# Patient Record
Sex: MALE | Race: WHITE | NOT HISPANIC OR LATINO | Employment: FULL TIME | URBAN - METROPOLITAN AREA
[De-identification: names, ages, dates, MRNs, and addresses within clinical notes are randomized per-mention and may not be internally consistent; named-entity substitution may affect disease eponyms.]

---

## 2017-02-02 DIAGNOSIS — G47.419 NARCOLEPSY WITHOUT CATAPLEXY(347.00): ICD-10-CM

## 2017-02-02 RX ORDER — METHYLPHENIDATE HYDROCHLORIDE 54 MG/1
54 TABLET ORAL EVERY MORNING
Qty: 30 TABLET | Refills: 0 | Status: SHIPPED | OUTPATIENT
Start: 2017-02-02 | End: 2017-04-03 | Stop reason: SDUPTHER

## 2017-02-02 RX ORDER — METHYLPHENIDATE HYDROCHLORIDE 5 MG/1
TABLET ORAL
Qty: 60 TABLET | Refills: 0 | Status: SHIPPED | OUTPATIENT
Start: 2017-02-02 | End: 2017-04-03 | Stop reason: SDUPTHER

## 2017-03-31 ENCOUNTER — PATIENT MESSAGE (OUTPATIENT)
Dept: SLEEP MEDICINE | Facility: CLINIC | Age: 27
End: 2017-03-31

## 2017-03-31 DIAGNOSIS — G47.419 NARCOLEPSY WITHOUT CATAPLEXY(347.00): ICD-10-CM

## 2017-03-31 RX ORDER — METHYLPHENIDATE HYDROCHLORIDE 54 MG/1
54 TABLET ORAL EVERY MORNING
Qty: 30 TABLET | Refills: 0 | Status: CANCELLED | OUTPATIENT
Start: 2017-03-31

## 2017-03-31 RX ORDER — METHYLPHENIDATE HYDROCHLORIDE 5 MG/1
TABLET ORAL
Qty: 60 TABLET | Refills: 0 | Status: CANCELLED | OUTPATIENT
Start: 2017-03-31

## 2017-04-03 ENCOUNTER — TELEPHONE (OUTPATIENT)
Dept: SLEEP MEDICINE | Facility: CLINIC | Age: 27
End: 2017-04-03

## 2017-04-03 ENCOUNTER — PATIENT MESSAGE (OUTPATIENT)
Dept: SLEEP MEDICINE | Facility: CLINIC | Age: 27
End: 2017-04-03

## 2017-04-03 DIAGNOSIS — G47.419 NARCOLEPSY WITHOUT CATAPLEXY(347.00): ICD-10-CM

## 2017-04-03 RX ORDER — METHYLPHENIDATE HYDROCHLORIDE 5 MG/1
TABLET ORAL
Qty: 60 TABLET | Refills: 0 | Status: SHIPPED | OUTPATIENT
Start: 2017-04-03 | End: 2017-11-02 | Stop reason: SDUPTHER

## 2017-04-03 RX ORDER — METHYLPHENIDATE HYDROCHLORIDE 54 MG/1
54 TABLET ORAL EVERY MORNING
Qty: 30 TABLET | Refills: 0 | Status: SHIPPED | OUTPATIENT
Start: 2017-04-03 | End: 2017-06-30 | Stop reason: SDUPTHER

## 2017-04-03 NOTE — TELEPHONE ENCOUNTER
Will reorder the medications to the pharmacy.              ---------------------               The following medication renewals have been denied:      - methylphenidate (RITALIN) 5 MG tablet    - methylphenidate (CONCERTA) 54 MG CR tablet     If you have any questions about your prescription, please send a message to your doctor.                                       Rosio Carlisle MA routed conversation to You 3 days ago                       Jose Carlisle MA 3 days ago                       Jose Plummer   You 3 days ago                          Original authorizing provider: MD Jose Jasso would like a refill of the following medications:   methylphenidate (RITALIN) 5 MG tablet [Deisi Villalta MD]   methylphenidate (CONCERTA) 54 MG CR tablet [Deisi Villalta MD]     Preferred pharmacy: [facility] [address] [place] [address] [address] AT [facility] [address]     Comment:

## 2017-06-30 ENCOUNTER — TELEPHONE (OUTPATIENT)
Dept: SLEEP MEDICINE | Facility: CLINIC | Age: 27
End: 2017-06-30

## 2017-06-30 DIAGNOSIS — G47.419 NARCOLEPSY WITHOUT CATAPLEXY(347.00): ICD-10-CM

## 2017-06-30 RX ORDER — METHYLPHENIDATE HYDROCHLORIDE 54 MG/1
54 TABLET ORAL EVERY MORNING
Qty: 30 TABLET | Refills: 0 | Status: SHIPPED | OUTPATIENT
Start: 2017-06-30 | End: 2017-11-02 | Stop reason: SDUPTHER

## 2017-11-02 ENCOUNTER — PATIENT MESSAGE (OUTPATIENT)
Dept: SLEEP MEDICINE | Facility: CLINIC | Age: 27
End: 2017-11-02

## 2017-11-02 DIAGNOSIS — G47.419 NARCOLEPSY WITHOUT CATAPLEXY(347.00): ICD-10-CM

## 2017-11-02 DIAGNOSIS — G47.419 NARCOLEPSY WITHOUT CATAPLEXY: ICD-10-CM

## 2017-11-02 RX ORDER — METHYLPHENIDATE HYDROCHLORIDE 5 MG/1
TABLET ORAL
Qty: 60 TABLET | Refills: 0 | Status: SHIPPED | OUTPATIENT
Start: 2017-11-02 | End: 2018-01-09 | Stop reason: SDUPTHER

## 2017-11-02 RX ORDER — METHYLPHENIDATE HYDROCHLORIDE 54 MG/1
54 TABLET ORAL EVERY MORNING
Qty: 30 TABLET | Refills: 0 | OUTPATIENT
Start: 2017-11-02

## 2017-11-02 RX ORDER — METHYLPHENIDATE HYDROCHLORIDE 54 MG/1
54 TABLET ORAL EVERY MORNING
Qty: 30 TABLET | Refills: 0 | Status: SHIPPED | OUTPATIENT
Start: 2017-11-02 | End: 2018-01-09 | Stop reason: SDUPTHER

## 2017-11-02 NOTE — TELEPHONE ENCOUNTER
I will refill medications - please put on a recall to be able to see him late January when his insurance kicks in.            I have asked for a renewal on my medications. We recently came into a financial issue. I lost my job and insurance along with it. I now have a new job and I am almost out of medicine. The company I am with is not allowing me to get insured until January now. I can afford the medications I need but won't be able to see you until I receive insurance.

## 2018-01-09 DIAGNOSIS — G47.419 NARCOLEPSY WITHOUT CATAPLEXY: ICD-10-CM

## 2018-01-11 RX ORDER — METHYLPHENIDATE HYDROCHLORIDE 54 MG/1
54 TABLET ORAL EVERY MORNING
Qty: 30 TABLET | Refills: 0 | Status: SHIPPED | OUTPATIENT
Start: 2018-01-11 | End: 2018-02-22 | Stop reason: ALTCHOICE

## 2018-01-11 RX ORDER — METHYLPHENIDATE HYDROCHLORIDE 5 MG/1
TABLET ORAL
Qty: 60 TABLET | Refills: 0 | Status: SHIPPED | OUTPATIENT
Start: 2018-01-11 | End: 2018-02-22 | Stop reason: SDUPTHER

## 2018-02-22 ENCOUNTER — OFFICE VISIT (OUTPATIENT)
Dept: SLEEP MEDICINE | Facility: CLINIC | Age: 28
End: 2018-02-22
Payer: COMMERCIAL

## 2018-02-22 VITALS
HEIGHT: 66 IN | DIASTOLIC BLOOD PRESSURE: 80 MMHG | SYSTOLIC BLOOD PRESSURE: 119 MMHG | WEIGHT: 172 LBS | BODY MASS INDEX: 27.64 KG/M2 | HEART RATE: 84 BPM

## 2018-02-22 DIAGNOSIS — G47.419 NARCOLEPSY WITHOUT CATAPLEXY: ICD-10-CM

## 2018-02-22 PROCEDURE — 99999 PR PBB SHADOW E&M-EST. PATIENT-LVL III: CPT | Mod: PBBFAC,,, | Performed by: PSYCHIATRY & NEUROLOGY

## 2018-02-22 PROCEDURE — 99204 OFFICE O/P NEW MOD 45 MIN: CPT | Mod: S$GLB,,, | Performed by: PSYCHIATRY & NEUROLOGY

## 2018-02-22 RX ORDER — METHYLPHENIDATE HYDROCHLORIDE 5 MG/1
TABLET ORAL
Qty: 90 TABLET | Refills: 0 | Status: SHIPPED | OUTPATIENT
Start: 2018-02-22 | End: 2018-04-11 | Stop reason: SDUPTHER

## 2018-02-22 RX ORDER — METHYLPHENIDATE HYDROCHLORIDE 36 MG/1
36 TABLET ORAL EVERY MORNING
Qty: 30 TABLET | Refills: 0 | Status: SHIPPED | OUTPATIENT
Start: 2018-02-22 | End: 2018-04-03 | Stop reason: SDUPTHER

## 2018-02-22 NOTE — PROGRESS NOTES
"       INITIAL HISTORY OF PRESENT ILLNESS:  Jose Plummer is a 28 y.o. male is here for mgt. Of narcolepsy.     CHIEF COMPLAINT:    Sleep hx:  excessive daytime sleepiness, excessive daytime fatigue and snoring since  highschool.    He is a mouth breather.    His mother was recently diagnosed with ROMERO    Denies  dry mouth and sore throat  Reports occasional nasal congestion when sleeping  Reports occasional  morning headaches  Denies  interrupted sleep  Denies frequent leg movements  Denies symptoms concerning for parasomnia    The ESS ([place] Sleepiness Score) taken on initial visit is 15 /24.  He reports significant sleepiness while driving - for long distances has been needing a ride lately.  He has significant retrognathia - considering maxilla advancement surgeon.      The patient never had tonsillectomy, adenoidectomy or UPPP       INTERVAL HISTORY:    09/30/2014: The patient has not presented any new complaints since the previous visit. He reports Some improvement in sleep continuity and snoring. on Breath Rite strips. His AHI on recent PSG was 0.1 and RDI was 2.5/hour. He states that he is averaging 7-8 hours per night. He is going to switch 3-11 PM shift. he denies cataplexy, sleep paralysis, hallucinations. Still reports significant daytimes sleepiness affecting his work ([employer]) and everyday life. ESS 13/24.      01/12/2015:   He comes to discuss PSG/MSLT results. he denies cataplexy, sleep paralysis, hallucinations, headaches, palpitations, tremors, anxiety, or rash. Never took stimulants in the past.      02/09/2015: Mr. Plummer reports improved sleepiness on Nuvigil 150 mg - to stay focused cuts pills to get 200 mg dose which works better. Tolerates well. /75 today.  He is taking caffeine to stay awake again. No headache. Nuvigil helps to concentrate. ESS 5/24 today. His girlfriend feels his personality changed to "less happy" since on Nuvigil. Denied headache and " nausea.    05/26/2015  He comes with his sister Denise.  Tolerates 200 mg Nuvigil well. Occasional headaches that resolve spontaneously. No rash. /73 still. No night shifts. No difficulty falling asleep. No rash.  Nuvigil seems to loose effect - state it is hard for him to stay awake at work (about 3:30 ). ESS 9/24.     10/12/15:  Insufficient effect on 250 mg Nuvigil was not effective.  Ritalin 10 mg IR was started by JOSÉ MIGUEL Eisenberg- currently taking 10-20 mg per day. Nuvigil was d/c. On Ritalin still feels groggy before and after work, and that affects driving.  Work performance has improved on Ritalin. ESS 16/24 today.     02/17/2016: Concerta was recently increased from 27 to 36, as 27 started being insufficient. Taking 1 or 2 Ritalin IR 5 mg in addition to that.  Takes a nap at work at times. ESS11/24. Denied cataplexy - denied more generalized continuous weakness/fatigue throughout the day. Occasionally has the pain at the back of the neck and over trapezious area that started on Ritalin 27 mg - worse on 36 mg. Never tried to miss a day to see if that helps neck pain. ESS 11/24. Sleeps 7 hrs at night and sometimes 15-30 min nap at work. Naps are refreshing 30 min after a nap (initially groggy). Does not remember when he had a dream last time.  No headaches, mood swings, anorexia. Denied catoplexia.    6/9/16: On Concerta 36mg qam not lasting as long anymore, has to take earlier booster ritalin, now at 0730-8a, which only last ~ 1hr also. He has been having to take add'l 5mg to help for the past 2-mos.  Work performance being affected.Having to leave work at 2p instead of earlier due to slower performance, fatigue. Medications work well the first month or so then lose effectiveness. Tried nuvigil. Provigil, ritalin, concerta. Having episodic headaches, mod-severe, unable to describe nature/location, occurring when laughing or cutting up with friends. Premedicating with 3 advil sometimes helps. Advil typically  "relieves headache +rest. If not tylenol or aleve may work. Occasional slurring of speech when very sleepy, his wife notices. She is here today. ESS 17 today. HIT-6 score= 63. Worried about what will happen when nothing works anymore.     07/07/2016: Concerta 60 mg  works 2 AM - till 9 AM. Sometimes taking Ritalin at 9 AM  And noontime later. ESS is 10/'24 - happy with sleepiness control. His wife noticed that at times he starts mumbling when sleepiness comes on. He is not aware of that. That's not related to emotion. Still having headaches when he is with friends. Has no clear correlation with emotions. VS were stable today. Bedtime 7:30-8 pm - and he is up with at 2 AM. When he was on 8 hrs sleep schedule - he did not notice difference in sleepiness.   He reports moodiness - wonders he should see a psychiatrist. He has not tried Xyrem yet.     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  Sleepiness significantly improved with better work scheduled.  ESS 13/24.   Concerta 54 - feels "too high"   now - feeling jittery in the mid-day and headaches on a higher dpse and sometimes trouble falling asleep.  He liked his brother's Concerta 36 mg much better (brother taking for ADD).  Taking Ritalin 5 IR as needed on some days. Some days no taking Concerta, but taking 3 pills of 5 mg Ritalin ER.   Still occasional muscle  Spasms that he used to relate to Concerta. No further indications of cataplexy.  No trouble falling or staying asleep.    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    Bed partner: Mylene  Time to bed: 10-11 PM  Sleep onset latency: 30 min  Disruptions or awakenings: 0  Time to fall back into sleep: not long  Wakeup time: 5 AM for morning shift; or till 10:30-11:11 AM   Perceived sleep quality: 3/5  Perceived total sleep time:  7-8  hours.  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 MSLT 15 seconds. REM sleep on 3/4 naps.                            REVIEW OF SYSTEMS:   Sleep related symptoms as per HPI, lack of appetite after 2pm (not eating dinners with family anymore), denies depression but not sure or not, aggravated by feeling sleepy, Otherwise a balance review of 10-systems is negative.       PHYSICAL EXAM:  /80   Pulse 84   Ht 5' 6" (1.676 m)   Wt 78 kg (172 lb)   BMI 27.76 kg/m²   GENERAL: Normal development, well groomed.      ASSESSMENT:    1.Narcolepsy with mild cataplexy recently (slurred speech). PSG was negative for  sleep disordered breathing. He still reports significant sleepiness affecting his job and daily life. he denies sleep paralysis, hallucinations. He appears to sleep sufficient hours. Extensive metabolic work up for fatigue/hypersomnolence was normal. Sleep disordered breathing was ruled out. Failed Nuvigil.Concerta was escalated to 60 mg + Ritalin 10 mg BID - currently working well. Has not tried Xyrem yet.       PLAN:    Concerta 60 mg and Ritalin 10 mg BID are well tolerated.    He has not tried Xyrem yet.    Adderall is another option    If he decides to take Xyrem, Begin Xyrem 4.5 G taken 2 divided doses and up-titrate to goal of 6G prior to next clinic visit, re-evaluation. Discussed guideline to treat cataplexy and EDS, safe use, serious side effects associated with use. He does not drink alcohol. REM program enrollment form and 30d trial form/script completed today.     Precautions: The patient was advised to abstain from driving should he feel sleepy or drowsy.    RTC 6-wks. Case discussed with Dr. Villalta            "

## 2018-04-03 RX ORDER — METHYLPHENIDATE HYDROCHLORIDE 36 MG/1
36 TABLET ORAL EVERY MORNING
Qty: 30 TABLET | Refills: 0 | Status: SHIPPED | OUTPATIENT
Start: 2018-04-03 | End: 2018-04-11 | Stop reason: SDUPTHER

## 2018-04-11 ENCOUNTER — PATIENT MESSAGE (OUTPATIENT)
Dept: SLEEP MEDICINE | Facility: CLINIC | Age: 28
End: 2018-04-11

## 2018-04-11 DIAGNOSIS — G47.419 NARCOLEPSY WITHOUT CATAPLEXY: ICD-10-CM

## 2018-04-11 RX ORDER — METHYLPHENIDATE HYDROCHLORIDE 5 MG/1
TABLET ORAL
Qty: 90 TABLET | Refills: 0 | Status: SHIPPED | OUTPATIENT
Start: 2018-04-11 | End: 2018-04-16 | Stop reason: SDUPTHER

## 2018-04-11 RX ORDER — METHYLPHENIDATE HYDROCHLORIDE 36 MG/1
36 TABLET ORAL EVERY MORNING
Qty: 30 TABLET | Refills: 0 | Status: SHIPPED | OUTPATIENT
Start: 2018-04-11 | End: 2018-05-14 | Stop reason: SDUPTHER

## 2018-04-16 ENCOUNTER — TELEPHONE (OUTPATIENT)
Dept: SLEEP MEDICINE | Facility: CLINIC | Age: 28
End: 2018-04-16

## 2018-04-16 DIAGNOSIS — G47.419 NARCOLEPSY WITHOUT CATAPLEXY: ICD-10-CM

## 2018-04-16 RX ORDER — METHYLPHENIDATE HYDROCHLORIDE 5 MG/1
TABLET ORAL
Qty: 90 TABLET | Refills: 0 | Status: SHIPPED | OUTPATIENT
Start: 2018-04-16 | End: 2018-05-14 | Stop reason: SDUPTHER

## 2018-04-16 NOTE — TELEPHONE ENCOUNTER
I wanted to make sure my pharmacy was changed to the [facility] in [facility] off of [facility]. I [facility] and I wanted the make sure it was switched.      I will resend rx to [facility] in [place]

## 2018-05-14 ENCOUNTER — PATIENT MESSAGE (OUTPATIENT)
Dept: SLEEP MEDICINE | Facility: CLINIC | Age: 28
End: 2018-05-14

## 2018-05-14 DIAGNOSIS — G47.419 NARCOLEPSY WITHOUT CATAPLEXY: ICD-10-CM

## 2018-05-14 RX ORDER — METHYLPHENIDATE HYDROCHLORIDE 5 MG/1
TABLET ORAL
Qty: 90 TABLET | Refills: 0 | Status: SHIPPED | OUTPATIENT
Start: 2018-05-14 | End: 2018-06-26 | Stop reason: SDUPTHER

## 2018-05-14 RX ORDER — METHYLPHENIDATE HYDROCHLORIDE 36 MG/1
36 TABLET ORAL EVERY MORNING
Qty: 30 TABLET | Refills: 0 | Status: SHIPPED | OUTPATIENT
Start: 2018-05-14 | End: 2018-06-13 | Stop reason: SDUPTHER

## 2018-05-14 NOTE — TELEPHONE ENCOUNTER
Patient would like to have a refill on the following medication:    Refill on concerta and ritalin please

## 2018-06-13 ENCOUNTER — TELEPHONE (OUTPATIENT)
Dept: SLEEP MEDICINE | Facility: CLINIC | Age: 28
End: 2018-06-13

## 2018-06-13 ENCOUNTER — PATIENT MESSAGE (OUTPATIENT)
Dept: SLEEP MEDICINE | Facility: CLINIC | Age: 28
End: 2018-06-13

## 2018-06-13 DIAGNOSIS — G47.419 NARCOLEPSY WITHOUT CATAPLEXY: Primary | ICD-10-CM

## 2018-06-13 RX ORDER — METHYLPHENIDATE HYDROCHLORIDE 36 MG/1
36 TABLET ORAL EVERY MORNING
Qty: 30 TABLET | Refills: 0 | Status: SHIPPED | OUTPATIENT
Start: 2018-06-13 | End: 2018-07-16 | Stop reason: SDUPTHER

## 2018-06-25 ENCOUNTER — PATIENT MESSAGE (OUTPATIENT)
Dept: SLEEP MEDICINE | Facility: CLINIC | Age: 28
End: 2018-06-25

## 2018-06-26 DIAGNOSIS — G47.419 NARCOLEPSY WITHOUT CATAPLEXY: ICD-10-CM

## 2018-06-26 RX ORDER — METHYLPHENIDATE HYDROCHLORIDE 5 MG/1
TABLET ORAL
Qty: 90 TABLET | Refills: 0 | Status: SHIPPED | OUTPATIENT
Start: 2018-06-26 | End: 2018-07-26 | Stop reason: SDUPTHER

## 2018-07-14 ENCOUNTER — PATIENT MESSAGE (OUTPATIENT)
Dept: SLEEP MEDICINE | Facility: CLINIC | Age: 28
End: 2018-07-14

## 2018-07-16 DIAGNOSIS — G47.419 NARCOLEPSY WITHOUT CATAPLEXY: ICD-10-CM

## 2018-07-16 RX ORDER — METHYLPHENIDATE HYDROCHLORIDE 36 MG/1
36 TABLET ORAL EVERY MORNING
Qty: 30 TABLET | Refills: 0 | Status: SHIPPED | OUTPATIENT
Start: 2018-07-16 | End: 2018-08-16 | Stop reason: SDUPTHER

## 2018-07-26 ENCOUNTER — PATIENT MESSAGE (OUTPATIENT)
Dept: SLEEP MEDICINE | Facility: CLINIC | Age: 28
End: 2018-07-26

## 2018-07-26 DIAGNOSIS — G47.419 NARCOLEPSY WITHOUT CATAPLEXY: ICD-10-CM

## 2018-07-26 RX ORDER — METHYLPHENIDATE HYDROCHLORIDE 5 MG/1
TABLET ORAL
Qty: 90 TABLET | Refills: 0 | Status: SHIPPED | OUTPATIENT
Start: 2018-07-26 | End: 2018-08-28 | Stop reason: SDUPTHER

## 2018-08-16 ENCOUNTER — PATIENT MESSAGE (OUTPATIENT)
Dept: SLEEP MEDICINE | Facility: CLINIC | Age: 28
End: 2018-08-16

## 2018-08-16 DIAGNOSIS — G47.419 NARCOLEPSY WITHOUT CATAPLEXY: ICD-10-CM

## 2018-08-17 RX ORDER — METHYLPHENIDATE HYDROCHLORIDE 36 MG/1
36 TABLET ORAL EVERY MORNING
Qty: 30 TABLET | Refills: 0 | Status: SHIPPED | OUTPATIENT
Start: 2018-08-17 | End: 2018-09-13 | Stop reason: SDUPTHER

## 2018-08-28 ENCOUNTER — PATIENT MESSAGE (OUTPATIENT)
Dept: SLEEP MEDICINE | Facility: CLINIC | Age: 28
End: 2018-08-28

## 2018-08-28 DIAGNOSIS — G47.419 NARCOLEPSY WITHOUT CATAPLEXY: ICD-10-CM

## 2018-08-28 RX ORDER — METHYLPHENIDATE HYDROCHLORIDE 5 MG/1
TABLET ORAL
Qty: 90 TABLET | Refills: 0 | Status: SHIPPED | OUTPATIENT
Start: 2018-08-28 | End: 2018-09-27 | Stop reason: SDUPTHER

## 2018-09-13 ENCOUNTER — PATIENT MESSAGE (OUTPATIENT)
Dept: SLEEP MEDICINE | Facility: CLINIC | Age: 28
End: 2018-09-13

## 2018-09-13 DIAGNOSIS — G47.419 NARCOLEPSY WITHOUT CATAPLEXY: ICD-10-CM

## 2018-09-13 RX ORDER — METHYLPHENIDATE HYDROCHLORIDE 36 MG/1
36 TABLET ORAL EVERY MORNING
Qty: 30 TABLET | Refills: 0 | Status: SHIPPED | OUTPATIENT
Start: 2018-09-13 | End: 2018-10-15 | Stop reason: SDUPTHER

## 2018-09-26 ENCOUNTER — PATIENT MESSAGE (OUTPATIENT)
Dept: SLEEP MEDICINE | Facility: CLINIC | Age: 28
End: 2018-09-26

## 2018-09-27 DIAGNOSIS — G47.419 NARCOLEPSY WITHOUT CATAPLEXY: ICD-10-CM

## 2018-09-27 RX ORDER — METHYLPHENIDATE HYDROCHLORIDE 5 MG/1
TABLET ORAL
Qty: 90 TABLET | Refills: 0 | Status: SHIPPED | OUTPATIENT
Start: 2018-09-27 | End: 2018-10-15 | Stop reason: SDUPTHER

## 2018-10-14 ENCOUNTER — PATIENT MESSAGE (OUTPATIENT)
Dept: SLEEP MEDICINE | Facility: CLINIC | Age: 28
End: 2018-10-14

## 2018-10-15 DIAGNOSIS — G47.419 NARCOLEPSY WITHOUT CATAPLEXY: ICD-10-CM

## 2018-10-15 RX ORDER — METHYLPHENIDATE HYDROCHLORIDE 36 MG/1
36 TABLET ORAL EVERY MORNING
Qty: 30 TABLET | Refills: 0 | Status: SHIPPED | OUTPATIENT
Start: 2018-10-15 | End: 2018-11-16 | Stop reason: SDUPTHER

## 2018-10-15 RX ORDER — METHYLPHENIDATE HYDROCHLORIDE 5 MG/1
TABLET ORAL
Qty: 90 TABLET | Refills: 0 | Status: SHIPPED | OUTPATIENT
Start: 2018-10-15 | End: 2018-11-28 | Stop reason: SDUPTHER

## 2018-11-16 ENCOUNTER — PATIENT MESSAGE (OUTPATIENT)
Dept: SLEEP MEDICINE | Facility: CLINIC | Age: 28
End: 2018-11-16

## 2018-11-16 DIAGNOSIS — G47.419 NARCOLEPSY WITHOUT CATAPLEXY: ICD-10-CM

## 2018-11-16 RX ORDER — METHYLPHENIDATE HYDROCHLORIDE 36 MG/1
36 TABLET ORAL EVERY MORNING
Qty: 30 TABLET | Refills: 0 | Status: SHIPPED | OUTPATIENT
Start: 2018-11-16 | End: 2018-12-14 | Stop reason: SDUPTHER

## 2018-11-28 ENCOUNTER — PATIENT MESSAGE (OUTPATIENT)
Dept: SLEEP MEDICINE | Facility: CLINIC | Age: 28
End: 2018-11-28

## 2018-11-28 DIAGNOSIS — G47.419 NARCOLEPSY WITHOUT CATAPLEXY: ICD-10-CM

## 2018-11-28 RX ORDER — METHYLPHENIDATE HYDROCHLORIDE 5 MG/1
TABLET ORAL
Qty: 90 TABLET | Refills: 0 | Status: SHIPPED | OUTPATIENT
Start: 2018-11-28 | End: 2018-12-14 | Stop reason: SDUPTHER

## 2018-12-14 ENCOUNTER — PATIENT MESSAGE (OUTPATIENT)
Dept: SLEEP MEDICINE | Facility: CLINIC | Age: 28
End: 2018-12-14

## 2018-12-14 DIAGNOSIS — G47.419 NARCOLEPSY WITHOUT CATAPLEXY: ICD-10-CM

## 2018-12-14 RX ORDER — METHYLPHENIDATE HYDROCHLORIDE 5 MG/1
TABLET ORAL
Qty: 90 TABLET | Refills: 0 | Status: SHIPPED | OUTPATIENT
Start: 2018-12-14 | End: 2019-02-18 | Stop reason: SDUPTHER

## 2018-12-14 RX ORDER — METHYLPHENIDATE HYDROCHLORIDE 36 MG/1
36 TABLET ORAL EVERY MORNING
Qty: 30 TABLET | Refills: 0 | Status: SHIPPED | OUTPATIENT
Start: 2018-12-14 | End: 2019-01-23 | Stop reason: SDUPTHER

## 2019-01-18 ENCOUNTER — PATIENT MESSAGE (OUTPATIENT)
Dept: SLEEP MEDICINE | Facility: CLINIC | Age: 29
End: 2019-01-18

## 2019-01-18 ENCOUNTER — TELEPHONE (OUTPATIENT)
Dept: SLEEP MEDICINE | Facility: CLINIC | Age: 29
End: 2019-01-18

## 2019-01-19 NOTE — TELEPHONE ENCOUNTER
Prescription     Maikol Muro MA   You 12 hours ago (6:42 AM)      Pt asked to change pharmacy from [facility] to [facility] in [facility]. He's also requesting a refill on rx Concerta.    Routing Comment       Jose Plummer   You 18 hours ago (12:07 AM)         I would like to change where my prescription goes. It's the [facility] in [facility]. My medicine is cheaper there then at [facility]. I'm low on the concerta.

## 2019-01-23 DIAGNOSIS — G47.419 NARCOLEPSY WITHOUT CATAPLEXY: ICD-10-CM

## 2019-01-23 RX ORDER — METHYLPHENIDATE HYDROCHLORIDE 36 MG/1
36 TABLET ORAL EVERY MORNING
Qty: 30 TABLET | Refills: 0 | Status: SHIPPED | OUTPATIENT
Start: 2019-01-23 | End: 2020-03-16 | Stop reason: ALTCHOICE

## 2019-02-15 ENCOUNTER — PATIENT MESSAGE (OUTPATIENT)
Dept: SLEEP MEDICINE | Facility: CLINIC | Age: 29
End: 2019-02-15

## 2019-02-15 DIAGNOSIS — G47.419 NARCOLEPSY WITHOUT CATAPLEXY: ICD-10-CM

## 2019-02-18 ENCOUNTER — PATIENT MESSAGE (OUTPATIENT)
Dept: SLEEP MEDICINE | Facility: CLINIC | Age: 29
End: 2019-02-18

## 2019-02-18 RX ORDER — METHYLPHENIDATE HYDROCHLORIDE 5 MG/1
TABLET ORAL
Qty: 90 TABLET | Refills: 0 | Status: SHIPPED | OUTPATIENT
Start: 2019-02-18 | End: 2019-04-22 | Stop reason: SDUPTHER

## 2019-04-20 ENCOUNTER — PATIENT MESSAGE (OUTPATIENT)
Dept: SLEEP MEDICINE | Facility: CLINIC | Age: 29
End: 2019-04-20

## 2019-04-22 DIAGNOSIS — G47.419 NARCOLEPSY WITHOUT CATAPLEXY: ICD-10-CM

## 2019-04-22 RX ORDER — METHYLPHENIDATE HYDROCHLORIDE 5 MG/1
TABLET ORAL
Qty: 90 TABLET | Refills: 0 | Status: SHIPPED | OUTPATIENT
Start: 2019-04-22 | End: 2019-06-03 | Stop reason: SDUPTHER

## 2019-05-31 ENCOUNTER — PATIENT MESSAGE (OUTPATIENT)
Dept: SLEEP MEDICINE | Facility: CLINIC | Age: 29
End: 2019-05-31

## 2019-06-03 DIAGNOSIS — G47.419 NARCOLEPSY WITHOUT CATAPLEXY: ICD-10-CM

## 2019-06-03 RX ORDER — METHYLPHENIDATE HYDROCHLORIDE 5 MG/1
TABLET ORAL
Qty: 90 TABLET | Refills: 0 | Status: SHIPPED | OUTPATIENT
Start: 2019-06-03 | End: 2019-07-23 | Stop reason: SDUPTHER

## 2019-06-03 NOTE — TELEPHONE ENCOUNTER
Scott,    Please schedule for an annual follow up me or NP to discuss further therapy      Thanks!

## 2019-07-18 ENCOUNTER — PATIENT MESSAGE (OUTPATIENT)
Dept: SLEEP MEDICINE | Facility: CLINIC | Age: 29
End: 2019-07-18

## 2019-07-19 NOTE — TELEPHONE ENCOUNTER
Pt haven't been seen since 02/22/2018, I left pt a vm to call me back so I can schedule him, so he can get his refill.

## 2019-07-22 DIAGNOSIS — G47.419 NARCOLEPSY WITHOUT CATAPLEXY: ICD-10-CM

## 2019-07-22 NOTE — TELEPHONE ENCOUNTER
"Alma [facility] Staff   Caller: Mylene "wife"  [phone] (Today, 10:44 AM)             .Type: Patient Call Back     Who called: Mylene "wife"     What is the request in detail: Pt's wife states that he needs a Need a refill on ritalin bc he's having trouble waking up  For work and can't take care of their 7 month old baby.     Can the clinic reply by [facility]? call back     Would the patient rather a call back or a response via [facility]?  Call back     Best call back number: [phone]        "

## 2019-07-23 RX ORDER — METHYLPHENIDATE HYDROCHLORIDE 5 MG/1
TABLET ORAL
Qty: 90 TABLET | Refills: 0 | Status: SHIPPED | OUTPATIENT
Start: 2019-07-23 | End: 2020-03-16 | Stop reason: ALTCHOICE

## 2019-07-23 NOTE — TELEPHONE ENCOUNTER
----- Message from Haylie Rodgers MA sent at 7/19/2019  5:08 PM CDT -----  Contact: self      ----- Message -----  From: Hilda Rodríguez  Sent: 7/19/2019   2:10 PM  To: Pawan Lipscomb Staff    Type:  Patient Returning Call    Who Called: ERIBERTO SAHU [[id number]]    Who Left Message for Patient: Ivettgunjan    Does the patient know what this is regarding?: yes     Best Call Back Number:[phone]    Additional Information:

## 2019-07-29 ENCOUNTER — OFFICE VISIT (OUTPATIENT)
Dept: SLEEP MEDICINE | Facility: CLINIC | Age: 29
End: 2019-07-29
Payer: COMMERCIAL

## 2019-07-29 VITALS
BODY MASS INDEX: 29.89 KG/M2 | DIASTOLIC BLOOD PRESSURE: 73 MMHG | WEIGHT: 186 LBS | HEIGHT: 66 IN | SYSTOLIC BLOOD PRESSURE: 121 MMHG | HEART RATE: 83 BPM

## 2019-07-29 DIAGNOSIS — G47.419 NARCOLEPSY WITHOUT CATAPLEXY: Primary | ICD-10-CM

## 2019-07-29 PROCEDURE — 99212 OFFICE O/P EST SF 10 MIN: CPT | Mod: S$GLB,,, | Performed by: PSYCHIATRY & NEUROLOGY

## 2019-07-29 PROCEDURE — 3008F PR BODY MASS INDEX (BMI) DOCUMENTED: ICD-10-PCS | Mod: CPTII,S$GLB,, | Performed by: PSYCHIATRY & NEUROLOGY

## 2019-07-29 PROCEDURE — 99999 PR PBB SHADOW E&M-EST. PATIENT-LVL III: CPT | Mod: PBBFAC,,, | Performed by: PSYCHIATRY & NEUROLOGY

## 2019-07-29 PROCEDURE — 3008F BODY MASS INDEX DOCD: CPT | Mod: CPTII,S$GLB,, | Performed by: PSYCHIATRY & NEUROLOGY

## 2019-07-29 PROCEDURE — 99999 PR PBB SHADOW E&M-EST. PATIENT-LVL III: ICD-10-PCS | Mod: PBBFAC,,, | Performed by: PSYCHIATRY & NEUROLOGY

## 2019-07-29 PROCEDURE — 99212 PR OFFICE/OUTPT VISIT, EST, LEVL II, 10-19 MIN: ICD-10-PCS | Mod: S$GLB,,, | Performed by: PSYCHIATRY & NEUROLOGY

## 2019-07-29 RX ORDER — METHYLPHENIDATE HYDROCHLORIDE 10 MG/1
TABLET ORAL
Qty: 90 TABLET | Refills: 0 | Status: SHIPPED | OUTPATIENT
Start: 2019-07-29 | End: 2019-10-21 | Stop reason: SDUPTHER

## 2019-07-29 NOTE — PROGRESS NOTES
"       INITIAL HISTORY OF PRESENT ILLNESS:  Jose Plummer is a 29 y.o. male is here for mgt. Of narcolepsy.     CHIEF COMPLAINT:    Sleep hx:  excessive daytime sleepiness, excessive daytime fatigue and snoring since  highschool.    He is a mouth breather.    His mother was recently diagnosed with ROMERO    Denies  dry mouth and sore throat  Reports occasional nasal congestion when sleeping  Reports occasional  morning headaches  Denies  interrupted sleep  Denies frequent leg movements  Denies symptoms concerning for parasomnia    The ESS ([place] Sleepiness Score) taken on initial visit is 15 /24.  He reports significant sleepiness while driving - for long distances has been needing a ride lately.  He has significant retrognathia - considering maxilla advancement surgeon.      The patient never had tonsillectomy, adenoidectomy or UPPP       INTERVAL HISTORY:    09/30/2014: The patient has not presented any new complaints since the previous visit. He reports Some improvement in sleep continuity and snoring. on Breath Rite strips. His AHI on recent PSG was 0.1 and RDI was 2.5/hour. He states that he is averaging 7-8 hours per night. He is going to switch 3-11 PM shift. he denies cataplexy, sleep paralysis, hallucinations. Still reports significant daytimes sleepiness affecting his work ([employer]) and everyday life. ESS 13/24.      01/12/2015:   He comes to discuss PSG/MSLT results. he denies cataplexy, sleep paralysis, hallucinations, headaches, palpitations, tremors, anxiety, or rash. Never took stimulants in the past.      02/09/2015: Mr. Plummer reports improved sleepiness on Nuvigil 150 mg - to stay focused cuts pills to get 200 mg dose which works better. Tolerates well. /75 today.  He is taking caffeine to stay awake again. No headache. Nuvigil helps to concentrate. ESS 5/24 today. His girlfriend feels his personality changed to "less happy" since on Nuvigil. Denied headache and " nausea.    05/26/2015  He comes with his sister Denise.  Tolerates 200 mg Nuvigil well. Occasional headaches that resolve spontaneously. No rash. /73 still. No night shifts. No difficulty falling asleep. No rash.  Nuvigil seems to loose effect - state it is hard for him to stay awake at work (about 3:30 ). ESS 9/24.     10/12/15:  Insufficient effect on 250 mg Nuvigil was not effective.  Ritalin 10 mg IR was started by JOSÉ MIGUEL Eisenberg- currently taking 10-20 mg per day. Nuvigil was d/c. On Ritalin still feels groggy before and after work, and that affects driving.  Work performance has improved on Ritalin. ESS 16/24 today.     02/17/2016: Concerta was recently increased from 27 to 36, as 27 started being insufficient. Taking 1 or 2 Ritalin IR 5 mg in addition to that.  Takes a nap at work at times. ESS11/24. Denied cataplexy - denied more generalized continuous weakness/fatigue throughout the day. Occasionally has the pain at the back of the neck and over trapezious area that started on Ritalin 27 mg - worse on 36 mg. Never tried to miss a day to see if that helps neck pain. ESS 11/24. Sleeps 7 hrs at night and sometimes 15-30 min nap at work. Naps are refreshing 30 min after a nap (initially groggy). Does not remember when he had a dream last time.  No headaches, mood swings, anorexia. Denied catoplexia.    6/9/16: On Concerta 36mg qam not lasting as long anymore, has to take earlier booster ritalin, now at 0730-8a, which only last ~ 1hr also. He has been having to take add'l 5mg to help for the past 2-mos.  Work performance being affected.Having to leave work at 2p instead of earlier due to slower performance, fatigue. Medications work well the first month or so then lose effectiveness. Tried nuvigil. Provigil, ritalin, concerta. Having episodic headaches, mod-severe, unable to describe nature/location, occurring when laughing or cutting up with friends. Premedicating with 3 advil sometimes helps. Advil typically  "relieves headache +rest. If not tylenol or aleve may work. Occasional slurring of speech when very sleepy, his wife notices. She is here today. ESS 17 today. HIT-6 score= 63. Worried about what will happen when nothing works anymore.     07/07/2016: Concerta 60 mg  works 2 AM - till 9 AM. Sometimes taking Ritalin at 9 AM  And noontime later. ESS is 10/'24 - happy with sleepiness control. His wife noticed that at times he starts mumbling when sleepiness comes on. He is not aware of that. That's not related to emotion. Still having headaches when he is with friends. Has no clear correlation with emotions. VS were stable today. Bedtime 7:30-8 pm - and he is up with at 2 AM. When he was on 8 hrs sleep schedule - he did not notice difference in sleepiness.   He reports moodiness - wonders he should see a psychiatrist. He has not tried Xyrem yet.     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  Sleepiness significantly improved with better work scheduled.  ESS 13/24.   Concerta 54 - feels "too high"   now - feeling jittery in the mid-day and headaches on a higher dpse and sometimes trouble falling asleep.  He liked his brother's Concerta 36 mg much better (brother taking for ADD).  Taking Ritalin 5 IR as needed on some days. Some days no taking Concerta, but taking 3 pills of 5 mg Ritalin ER.   Still occasional muscle  Spasms that he used to relate to Concerta. No further indications of cataplexy.  No trouble falling or staying asleep.      07/29/2019: He is now working days - 7-3 PM.  Ran out of Concerta - and it now costs him $200 per month  Now taking 2-3 Ritalin 5 mg  pills a day.    Still residual sleepiness.    Has a 7 months old son now.  His wife now works nights.    + reports occasional speech slurring at the "low" of the dose, but hard to associate with emotion.    EPWORTH SLEEPINESS SCALE 7/29/2019   Sitting and reading 2   Watching TV " "1   Sitting, inactive in a public place (e.g. 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    Bed partner: Mylene  Time to bed: 10-11 PM  Sleep onset latency: 30 min  Disruptions or awakenings: 0  Time to fall back into sleep: not long  Wakeup time: 5 AM for morning shift; or till 10:30-11:11 AM   Perceived sleep quality: 3/5  Perceived total sleep time:  7-8  hours.  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 MSLT 15 seconds. REM sleep on 3/4 naps.                            REVIEW OF SYSTEMS:   Sleep related symptoms as per HPI, lack of appetite after 2pm (not eating dinners with family anymore), denies depression but not sure or not, aggravated by feeling sleepy, Otherwise a balance review of 10-systems is negative.       PHYSICAL EXAM:  /73   Pulse 83   Ht 5' 6" (1.676 m)   Wt 84.4 kg (186 lb)   BMI 30.02 kg/m²   GENERAL: Normal development, well groomed.      ASSESSMENT:    1.Narcolepsy with mild cataplexy recently (slurred speech). PSG was negative for  sleep disordered breathing. He still reports significant sleepiness affecting his job and daily life. he denies sleep paralysis, hallucinations. He appears to sleep sufficient hours. Extensive metabolic work up for fatigue/hypersomnolence was normal. Sleep disordered breathing was ruled out. Failed Nuvigil.Concerta was escalated to 60 mg + Ritalin 10 mg BID - currently working well. Has not tried Xyrem yet.       PLAN:    Stop Concerta due " to cost.  Will do Ritalin 10 mg TID are well tolerated.    He has not tried Xyrem yet.    Adderall is another option    If he decides to take Xyrem, Begin Xyrem 4.5 G taken 2 divided doses and up-titrate to goal of 6G prior to next clinic visit, re-evaluation. Discussed guideline to treat cataplexy and EDS, safe use, serious side effects associated with use. He does not drink alcohol. REM program enrollment form and 30d trial form/script completed today.     Precautions: The patient was advised to abstain from driving should he feel sleepy or drowsy.    RTC 6-wks. Case discussed with Dr. Villalta

## 2019-10-18 ENCOUNTER — PATIENT MESSAGE (OUTPATIENT)
Dept: SLEEP MEDICINE | Facility: CLINIC | Age: 29
End: 2019-10-18

## 2019-10-21 DIAGNOSIS — G47.419 NARCOLEPSY WITHOUT CATAPLEXY: ICD-10-CM

## 2019-10-21 RX ORDER — METHYLPHENIDATE HYDROCHLORIDE 10 MG/1
TABLET ORAL
Qty: 90 TABLET | Refills: 0 | Status: SHIPPED | OUTPATIENT
Start: 2019-10-21 | End: 2019-12-10 | Stop reason: SDUPTHER

## 2019-10-21 NOTE — TELEPHONE ENCOUNTER
LOV 07/29/19    Last filled methylphenidate HCl (RITALIN) 10 MG tablet 90 tablet w/ 0 refill on 7/29/2019.

## 2019-12-10 ENCOUNTER — PATIENT MESSAGE (OUTPATIENT)
Dept: SLEEP MEDICINE | Facility: CLINIC | Age: 29
End: 2019-12-10

## 2019-12-10 DIAGNOSIS — G47.419 NARCOLEPSY WITHOUT CATAPLEXY: ICD-10-CM

## 2019-12-10 RX ORDER — METHYLPHENIDATE HYDROCHLORIDE 10 MG/1
TABLET ORAL
Qty: 90 TABLET | Refills: 0 | Status: SHIPPED | OUTPATIENT
Start: 2019-12-10 | End: 2020-01-30 | Stop reason: SDUPTHER

## 2019-12-10 NOTE — TELEPHONE ENCOUNTER
LOV 07/29/19    Last filled methylphenidate HCl (RITALIN) 10 MG tablet 90 tablet w/ 0 refills on 10/21/2019.

## 2020-01-30 ENCOUNTER — PATIENT MESSAGE (OUTPATIENT)
Dept: SLEEP MEDICINE | Facility: CLINIC | Age: 30
End: 2020-01-30

## 2020-01-30 DIAGNOSIS — G47.419 NARCOLEPSY WITHOUT CATAPLEXY: ICD-10-CM

## 2020-01-30 RX ORDER — METHYLPHENIDATE HYDROCHLORIDE 10 MG/1
TABLET ORAL
Qty: 90 TABLET | Refills: 0 | Status: SHIPPED | OUTPATIENT
Start: 2020-01-30 | End: 2020-03-16 | Stop reason: SDUPTHER

## 2020-01-30 NOTE — TELEPHONE ENCOUNTER
LOV 07/29/19    Last filled methylphenidate HCl (RITALIN) 10 MG tablet 90 tablet w/ 0 refills on 12/10/2019.

## 2020-03-16 ENCOUNTER — PATIENT MESSAGE (OUTPATIENT)
Dept: SLEEP MEDICINE | Facility: CLINIC | Age: 30
End: 2020-03-16

## 2020-03-16 ENCOUNTER — TELEPHONE (OUTPATIENT)
Dept: SLEEP MEDICINE | Facility: CLINIC | Age: 30
End: 2020-03-16

## 2020-03-16 DIAGNOSIS — G47.419 NARCOLEPSY WITHOUT CATAPLEXY: ICD-10-CM

## 2020-03-16 RX ORDER — METHYLPHENIDATE HYDROCHLORIDE 10 MG/1
TABLET ORAL
Qty: 90 TABLET | Refills: 0 | Status: SHIPPED | OUTPATIENT
Start: 2020-03-16 | End: 2020-05-20 | Stop reason: SDUPTHER

## 2020-05-20 ENCOUNTER — PATIENT MESSAGE (OUTPATIENT)
Dept: SLEEP MEDICINE | Facility: CLINIC | Age: 30
End: 2020-05-20

## 2020-05-20 DIAGNOSIS — G47.419 NARCOLEPSY WITHOUT CATAPLEXY: ICD-10-CM

## 2020-05-20 NOTE — TELEPHONE ENCOUNTER
LOV 07/29/2019    Last filled methylphenidate HCl (RITALIN) 10 MG tablet 90 tablet w/ 0 refills on 3/16/2020.

## 2020-05-21 RX ORDER — METHYLPHENIDATE HYDROCHLORIDE 10 MG/1
TABLET ORAL
Qty: 90 TABLET | Refills: 0 | Status: SHIPPED | OUTPATIENT
Start: 2020-05-21 | End: 2020-07-06 | Stop reason: SDUPTHER

## 2020-07-06 ENCOUNTER — PATIENT MESSAGE (OUTPATIENT)
Dept: SLEEP MEDICINE | Facility: CLINIC | Age: 30
End: 2020-07-06

## 2020-07-06 DIAGNOSIS — G47.419 NARCOLEPSY WITHOUT CATAPLEXY: ICD-10-CM

## 2020-07-06 RX ORDER — METHYLPHENIDATE HYDROCHLORIDE 10 MG/1
TABLET ORAL
Qty: 90 TABLET | Refills: 0 | Status: SHIPPED | OUTPATIENT
Start: 2020-07-06 | End: 2020-08-19 | Stop reason: SDUPTHER

## 2020-07-06 NOTE — TELEPHONE ENCOUNTER
LOV 07/29/2019    Last filled methylphenidate HCl (RITALIN) 10 MG tablet 90 tablet w/ 0 refills on 5/21/2020.

## 2020-08-19 ENCOUNTER — PATIENT MESSAGE (OUTPATIENT)
Dept: SLEEP MEDICINE | Facility: CLINIC | Age: 30
End: 2020-08-19

## 2020-08-19 DIAGNOSIS — G47.419 NARCOLEPSY WITHOUT CATAPLEXY: ICD-10-CM

## 2020-08-19 RX ORDER — METHYLPHENIDATE HYDROCHLORIDE 10 MG/1
TABLET ORAL
Qty: 90 TABLET | Refills: 0 | Status: SHIPPED | OUTPATIENT
Start: 2020-08-19 | End: 2020-09-08 | Stop reason: SDUPTHER

## 2020-08-19 NOTE — TELEPHONE ENCOUNTER
LOV 07/29/2019    Last filled methylphenidate HCl (RITALIN) 10 MG tablet 90 tablet w/ 0 refills on 7/6/2020.     Pt is scheduled for his annual on 09/08/2020

## 2020-09-08 ENCOUNTER — OFFICE VISIT (OUTPATIENT)
Dept: SLEEP MEDICINE | Facility: CLINIC | Age: 30
End: 2020-09-08
Payer: COMMERCIAL

## 2020-09-08 DIAGNOSIS — G47.419 NARCOLEPSY WITHOUT CATAPLEXY: ICD-10-CM

## 2020-09-08 PROCEDURE — 99214 PR OFFICE/OUTPT VISIT, EST, LEVL IV, 30-39 MIN: ICD-10-PCS | Mod: 95,,, | Performed by: PSYCHIATRY & NEUROLOGY

## 2020-09-08 PROCEDURE — 99214 OFFICE O/P EST MOD 30 MIN: CPT | Mod: 95,,, | Performed by: PSYCHIATRY & NEUROLOGY

## 2020-09-08 RX ORDER — METHYLPHENIDATE HYDROCHLORIDE 10 MG/1
TABLET ORAL
Qty: 150 TABLET | Refills: 0 | Status: SHIPPED | OUTPATIENT
Start: 2020-09-08 | End: 2020-09-18 | Stop reason: DRUGHIGH

## 2020-09-08 NOTE — PROGRESS NOTES
The patient location is: home  The chief complaint leading to consultation is: 30 min  Visit type: audiovisual  Total time spent with patient: 30 min  Each patient to whom he or she provides medical services by telemedicine is:  (1) informed of the relationship between the physician and patient and the respective role of any other health care provider with respect to management of the patient; and (2) notified that he or she may decline to receive medical services by telemedicine and may withdraw from such care at any time.        Jose Plummer is a 30 y.o. 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   He has changed jobs since COVID lock down; he has to drive more for his new job; he gets tired sooner. He is managing his condition with Ritalin  10 mg TID (morning, aa and 1 PM).  ESS -10/24 today. Concerta was previously denied by insurance. Reports lack of energy towards the end of the day.  He denied sleep paralysis, cataplexy, sleep related hallucinations.     EPWORTH SLEEPINESS SCALE 7/29/2019   Sitting and reading 2   Watching TV 1   Sitting, inactive in a public place (e.g. a theatre or a meeting) 1   As a passenger in a car for an hour without a break 1   Lying down to rest in the afternoon when circumstances permit 3   Sitting and talking to someone 1   Sitting quietly after a lunch without alcohol 1   In a car, while stopped for a few minutes in traffic 0   Total score 10       PREVIOUSLY TRIED MEDICATION FOR PRESENTING CONDITION:    Concerta - was effective at 36 mg together with Ritalin IR, but denied by insurance. Provigil and Nuvigil were tried initially, but Jose Plummer  Has developed tolerance.   Possibility of using Xyrem was discussed at length, but the patient deferred due to the implications of taking this medications, highly controlled quality, needs to take twice a night and potential side effects.       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    Bed partner: Mylene  Time to bed: 10-11 PM  Sleep onset latency: 30 min  Disruptions or awakenings: 0  Time to fall back into sleep: not long  Wakeup time: 5 AM for morning shift; or till 10:30-11:11 AM   Perceived sleep quality: 3/5  Perceived total sleep time:  7-8  hours.  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   MSLT 15 seconds. REM sleep on 3/4 naps.                        REVIEW OF SYSTEMS:   Sleep related symptoms as per HPI, lack of appetite after 2pm (not eating dinners with family anymore), denies depression but not sure or not, aggravated by feeling sleepy, Otherwise a balance review of 10-systems is negative.       PHYSICAL EXAM:  There were no vitals taken for this visit. /82 was reported.  GENERAL: Normal development, well groomed.      ASSESSMENT:    1.Narcolepsy with mild cataplexy recently (slurred speech). PSG was negative for  sleep disordered breathing. He still reports significant sleepiness affecting his job and daily life. he denies sleep paralysis, hallucinations. He appears to sleep sufficient hours. Extensive metabolic work up for fatigue/hypersomnolence was normal. Sleep disordered breathing was ruled out. He was able to manage with Ritaling IR TID alone for a while since his Concerta got terminated by insurance, but now reports insufficient alertness, since his job got more demanding, and involves more driving.        PLAN:      Increase  Ritalin 10 mg TID to Ritain 10 mg up to 5 pills a day - he can increase the afternoon dose, or add another dose at 2-3 PM, unless it affects his sleep/      Precautions: The patient was advised to abstain from driving should he feel sleepy or drowsy.    RTC 6-wks. Case discussed with Dr. Villalta

## 2020-09-17 ENCOUNTER — PATIENT MESSAGE (OUTPATIENT)
Dept: SLEEP MEDICINE | Facility: CLINIC | Age: 30
End: 2020-09-17

## 2020-09-17 ENCOUNTER — TELEPHONE (OUTPATIENT)
Dept: SLEEP MEDICINE | Facility: CLINIC | Age: 30
End: 2020-09-17

## 2020-09-17 NOTE — TELEPHONE ENCOUNTER
Pt insurance will no longer cover his medication Methylphenidate 10 mg qty.150. His insurance will only cover the Methylphenidate 10 mg with a qty. 90/30 day supply or 270/90 day supply, or Methylphenidate 20 mg and 30 mg w/ qty. 60/30 day supply or 180/90 day supply. Ptlease resend a new prescription to his pharmacy.

## 2020-09-18 ENCOUNTER — PATIENT MESSAGE (OUTPATIENT)
Dept: SLEEP MEDICINE | Facility: CLINIC | Age: 30
End: 2020-09-18

## 2020-09-18 DIAGNOSIS — G47.419 NARCOLEPSY WITHOUT CATAPLEXY: Primary | ICD-10-CM

## 2020-09-18 RX ORDER — METHYLPHENIDATE HYDROCHLORIDE 30 MG/1
30 CAPSULE, EXTENDED RELEASE ORAL 2 TIMES DAILY
Qty: 60 CAPSULE | Refills: 0 | Status: SHIPPED | OUTPATIENT
Start: 2020-09-18 | End: 2020-10-30 | Stop reason: SDUPTHER

## 2020-09-27 ENCOUNTER — PATIENT MESSAGE (OUTPATIENT)
Dept: SLEEP MEDICINE | Facility: CLINIC | Age: 30
End: 2020-09-27

## 2020-10-29 ENCOUNTER — PATIENT MESSAGE (OUTPATIENT)
Dept: SLEEP MEDICINE | Facility: CLINIC | Age: 30
End: 2020-10-29

## 2020-10-29 DIAGNOSIS — G47.419 NARCOLEPSY WITHOUT CATAPLEXY: ICD-10-CM

## 2020-10-30 RX ORDER — METHYLPHENIDATE HYDROCHLORIDE 30 MG/1
30 CAPSULE, EXTENDED RELEASE ORAL 2 TIMES DAILY
Qty: 60 CAPSULE | Refills: 0 | Status: SHIPPED | OUTPATIENT
Start: 2020-10-30 | End: 2020-12-10 | Stop reason: ALTCHOICE

## 2020-10-30 NOTE — TELEPHONE ENCOUNTER
LOV 09/08/2020    Last filled methylphenidate HCl (RITALIN LA) 30 MG 24 hr capsule 60 capsule w/ 0 refills on 9/18/2020.

## 2020-11-02 ENCOUNTER — PATIENT MESSAGE (OUTPATIENT)
Dept: SLEEP MEDICINE | Facility: CLINIC | Age: 30
End: 2020-11-02

## 2020-11-02 ENCOUNTER — TELEPHONE (OUTPATIENT)
Dept: SLEEP MEDICINE | Facility: CLINIC | Age: 30
End: 2020-11-02

## 2020-11-02 DIAGNOSIS — G47.419 NARCOLEPSY WITHOUT CATAPLEXY: Primary | ICD-10-CM

## 2020-11-02 RX ORDER — METHYLPHENIDATE HYDROCHLORIDE 10 MG/1
TABLET ORAL
Qty: 150 TABLET | Refills: 0 | Status: SHIPPED | OUTPATIENT
Start: 2020-11-02 | End: 2020-12-09 | Stop reason: SDUPTHER

## 2020-11-03 NOTE — TELEPHONE ENCOUNTER
Dear  Elian       I will reorder your Ritalin.  I apologize, the one that was previously reordered was long acting.        Sincerely,    Deisi Villalta MD  ____________________

## 2020-12-08 ENCOUNTER — PATIENT MESSAGE (OUTPATIENT)
Dept: SLEEP MEDICINE | Facility: CLINIC | Age: 30
End: 2020-12-08

## 2020-12-09 ENCOUNTER — TELEPHONE (OUTPATIENT)
Dept: SLEEP MEDICINE | Facility: CLINIC | Age: 30
End: 2020-12-09

## 2020-12-09 DIAGNOSIS — G47.419 NARCOLEPSY WITHOUT CATAPLEXY: ICD-10-CM

## 2020-12-09 RX ORDER — METHYLPHENIDATE HYDROCHLORIDE 10 MG/1
TABLET ORAL
Qty: 150 TABLET | Refills: 0 | Status: SHIPPED | OUTPATIENT
Start: 2020-12-09 | End: 2021-01-20 | Stop reason: SDUPTHER

## 2020-12-09 NOTE — TELEPHONE ENCOUNTER
Will refill Ritalin  __________    Prescription    Maikol Muro MA  You 10 hours ago (7:18 AM)     Pt has requested a refill on Ritalin. LOV:9/8/20   Last ordered on:10/30/20    Routing comment       Jose Plummer  You 21 hours ago (8:23 PM)        Can I get a refill of the ritalin

## 2020-12-10 ENCOUNTER — TELEPHONE (OUTPATIENT)
Dept: SLEEP MEDICINE | Facility: CLINIC | Age: 30
End: 2020-12-10

## 2021-01-15 ENCOUNTER — PATIENT MESSAGE (OUTPATIENT)
Dept: SLEEP MEDICINE | Facility: CLINIC | Age: 31
End: 2021-01-15

## 2021-01-20 ENCOUNTER — TELEPHONE (OUTPATIENT)
Dept: SLEEP MEDICINE | Facility: CLINIC | Age: 31
End: 2021-01-20

## 2021-01-20 DIAGNOSIS — G47.419 NARCOLEPSY WITHOUT CATAPLEXY: ICD-10-CM

## 2021-01-20 RX ORDER — METHYLPHENIDATE HYDROCHLORIDE 10 MG/1
TABLET ORAL
Qty: 150 TABLET | Refills: 0 | Status: SHIPPED | OUTPATIENT
Start: 2021-01-20 | End: 2021-03-03 | Stop reason: SDUPTHER

## 2021-03-03 ENCOUNTER — PATIENT MESSAGE (OUTPATIENT)
Dept: SLEEP MEDICINE | Facility: CLINIC | Age: 31
End: 2021-03-03

## 2021-03-03 DIAGNOSIS — G47.419 NARCOLEPSY WITHOUT CATAPLEXY: ICD-10-CM

## 2021-03-03 RX ORDER — METHYLPHENIDATE HYDROCHLORIDE 10 MG/1
TABLET ORAL
Qty: 150 TABLET | Refills: 0 | Status: SHIPPED | OUTPATIENT
Start: 2021-03-03 | End: 2021-04-16 | Stop reason: SDUPTHER

## 2021-04-15 ENCOUNTER — PATIENT MESSAGE (OUTPATIENT)
Dept: SLEEP MEDICINE | Facility: CLINIC | Age: 31
End: 2021-04-15

## 2021-04-15 DIAGNOSIS — G47.419 NARCOLEPSY WITHOUT CATAPLEXY: ICD-10-CM

## 2021-04-16 RX ORDER — METHYLPHENIDATE HYDROCHLORIDE 10 MG/1
TABLET ORAL
Qty: 150 TABLET | Refills: 0 | Status: SHIPPED | OUTPATIENT
Start: 2021-04-16 | End: 2021-05-24 | Stop reason: SDUPTHER

## 2021-05-06 ENCOUNTER — PATIENT MESSAGE (OUTPATIENT)
Dept: RESEARCH | Facility: HOSPITAL | Age: 31
End: 2021-05-06

## 2021-05-24 ENCOUNTER — PATIENT MESSAGE (OUTPATIENT)
Dept: SLEEP MEDICINE | Facility: CLINIC | Age: 31
End: 2021-05-24

## 2021-05-24 DIAGNOSIS — G47.419 NARCOLEPSY WITHOUT CATAPLEXY: ICD-10-CM

## 2021-05-24 RX ORDER — METHYLPHENIDATE HYDROCHLORIDE 10 MG/1
TABLET ORAL
Qty: 150 TABLET | Refills: 0 | Status: SHIPPED | OUTPATIENT
Start: 2021-05-24 | End: 2021-06-30 | Stop reason: SDUPTHER

## 2021-05-26 ENCOUNTER — PATIENT MESSAGE (OUTPATIENT)
Dept: SLEEP MEDICINE | Facility: CLINIC | Age: 31
End: 2021-05-26

## 2021-06-29 ENCOUNTER — PATIENT MESSAGE (OUTPATIENT)
Dept: SLEEP MEDICINE | Facility: CLINIC | Age: 31
End: 2021-06-29

## 2021-06-30 DIAGNOSIS — G47.419 NARCOLEPSY WITHOUT CATAPLEXY: ICD-10-CM

## 2021-06-30 RX ORDER — METHYLPHENIDATE HYDROCHLORIDE 10 MG/1
TABLET ORAL
Qty: 150 TABLET | Refills: 0 | Status: SHIPPED | OUTPATIENT
Start: 2021-06-30 | End: 2021-08-09 | Stop reason: SDUPTHER

## 2021-08-09 ENCOUNTER — PATIENT MESSAGE (OUTPATIENT)
Dept: SLEEP MEDICINE | Facility: CLINIC | Age: 31
End: 2021-08-09

## 2021-08-09 DIAGNOSIS — G47.419 NARCOLEPSY WITHOUT CATAPLEXY: ICD-10-CM

## 2021-08-09 RX ORDER — METHYLPHENIDATE HYDROCHLORIDE 10 MG/1
TABLET ORAL
Qty: 150 TABLET | Refills: 0 | Status: SHIPPED | OUTPATIENT
Start: 2021-08-09 | End: 2021-09-24 | Stop reason: SDUPTHER

## 2021-09-21 ENCOUNTER — PATIENT MESSAGE (OUTPATIENT)
Dept: SLEEP MEDICINE | Facility: CLINIC | Age: 31
End: 2021-09-21

## 2021-09-24 ENCOUNTER — PATIENT MESSAGE (OUTPATIENT)
Dept: SLEEP MEDICINE | Facility: CLINIC | Age: 31
End: 2021-09-24

## 2021-09-24 DIAGNOSIS — G47.419 NARCOLEPSY WITHOUT CATAPLEXY: ICD-10-CM

## 2021-09-24 RX ORDER — METHYLPHENIDATE HYDROCHLORIDE 10 MG/1
TABLET ORAL
Qty: 150 TABLET | Refills: 0 | Status: SHIPPED | OUTPATIENT
Start: 2021-09-24 | End: 2021-11-03 | Stop reason: SDUPTHER

## 2021-09-30 ENCOUNTER — PATIENT MESSAGE (OUTPATIENT)
Dept: SLEEP MEDICINE | Facility: CLINIC | Age: 31
End: 2021-09-30

## 2021-10-04 ENCOUNTER — OFFICE VISIT (OUTPATIENT)
Dept: SLEEP MEDICINE | Facility: CLINIC | Age: 31
End: 2021-10-04
Payer: COMMERCIAL

## 2021-10-04 DIAGNOSIS — G47.419 NARCOLEPSY WITHOUT CATAPLEXY: Primary | ICD-10-CM

## 2021-10-04 PROCEDURE — 1160F PR REVIEW ALL MEDS BY PRESCRIBER/CLIN PHARMACIST DOCUMENTED: ICD-10-PCS | Mod: CPTII,95,, | Performed by: PSYCHIATRY & NEUROLOGY

## 2021-10-04 PROCEDURE — 99214 PR OFFICE/OUTPT VISIT, EST, LEVL IV, 30-39 MIN: ICD-10-PCS | Mod: 95,,, | Performed by: PSYCHIATRY & NEUROLOGY

## 2021-10-04 PROCEDURE — 1159F MED LIST DOCD IN RCRD: CPT | Mod: CPTII,95,, | Performed by: PSYCHIATRY & NEUROLOGY

## 2021-10-04 PROCEDURE — 99214 OFFICE O/P EST MOD 30 MIN: CPT | Mod: 95,,, | Performed by: PSYCHIATRY & NEUROLOGY

## 2021-10-04 PROCEDURE — 1159F PR MEDICATION LIST DOCUMENTED IN MEDICAL RECORD: ICD-10-PCS | Mod: CPTII,95,, | Performed by: PSYCHIATRY & NEUROLOGY

## 2021-10-04 PROCEDURE — 1160F RVW MEDS BY RX/DR IN RCRD: CPT | Mod: CPTII,95,, | Performed by: PSYCHIATRY & NEUROLOGY

## 2021-11-03 ENCOUNTER — PATIENT MESSAGE (OUTPATIENT)
Dept: SLEEP MEDICINE | Facility: CLINIC | Age: 31
End: 2021-11-03
Payer: COMMERCIAL

## 2021-11-03 DIAGNOSIS — G47.419 NARCOLEPSY WITHOUT CATAPLEXY: ICD-10-CM

## 2021-11-03 RX ORDER — METHYLPHENIDATE HYDROCHLORIDE 10 MG/1
TABLET ORAL
Qty: 150 TABLET | Refills: 0 | Status: SHIPPED | OUTPATIENT
Start: 2021-11-03 | End: 2021-12-14 | Stop reason: SDUPTHER

## 2021-12-13 ENCOUNTER — PATIENT MESSAGE (OUTPATIENT)
Dept: SLEEP MEDICINE | Facility: CLINIC | Age: 31
End: 2021-12-13
Payer: COMMERCIAL

## 2021-12-14 DIAGNOSIS — G47.419 NARCOLEPSY WITHOUT CATAPLEXY: ICD-10-CM

## 2021-12-14 RX ORDER — METHYLPHENIDATE HYDROCHLORIDE 10 MG/1
TABLET ORAL
Qty: 150 TABLET | Refills: 0 | Status: SHIPPED | OUTPATIENT
Start: 2021-12-14 | End: 2022-01-26 | Stop reason: SDUPTHER

## 2022-01-24 ENCOUNTER — PATIENT MESSAGE (OUTPATIENT)
Dept: SLEEP MEDICINE | Facility: CLINIC | Age: 32
End: 2022-01-24
Payer: COMMERCIAL

## 2022-01-26 DIAGNOSIS — G47.419 NARCOLEPSY WITHOUT CATAPLEXY: ICD-10-CM

## 2022-01-26 RX ORDER — METHYLPHENIDATE HYDROCHLORIDE 10 MG/1
TABLET ORAL
Qty: 150 TABLET | Refills: 0 | Status: SHIPPED | OUTPATIENT
Start: 2022-01-26 | End: 2022-01-26 | Stop reason: SDUPTHER

## 2022-01-26 RX ORDER — METHYLPHENIDATE HYDROCHLORIDE 10 MG/1
TABLET ORAL
Qty: 150 TABLET | Refills: 0 | Status: SHIPPED | OUTPATIENT
Start: 2022-01-26 | End: 2022-03-22 | Stop reason: SDUPTHER

## 2022-01-26 NOTE — PROGRESS NOTES
6 hours ago (8:37 AM)     CS    LOV: 10/4/21    Routing comment      Jose Plummer  You 8 hours ago (7:12 AM)           Sorry. But the [facility] in [place] on [address]         Schladweiler, Courtney R, MA Vazquez, Michael Joseph Yesterday (8:10 AM)     CS      Where would you like it sent to ?         Jose Plummer  You 2 days ago           May I get a refill for Ritalin?            Responsible Party    Deisi Villalta MD

## 2022-03-21 ENCOUNTER — PATIENT MESSAGE (OUTPATIENT)
Dept: SLEEP MEDICINE | Facility: CLINIC | Age: 32
End: 2022-03-21
Payer: COMMERCIAL

## 2022-03-22 DIAGNOSIS — G47.419 NARCOLEPSY WITHOUT CATAPLEXY: ICD-10-CM

## 2022-03-22 RX ORDER — METHYLPHENIDATE HYDROCHLORIDE 10 MG/1
TABLET ORAL
Qty: 150 TABLET | Refills: 0 | Status: SHIPPED | OUTPATIENT
Start: 2022-03-22 | End: 2022-05-26 | Stop reason: SDUPTHER

## 2022-03-22 NOTE — PROGRESS NOTES
2022  8:24 AM    To: Deisi Villalta MD     Prescription refill    Filomena Mosquera MA routed conversation to You 5 hours ago (8:24 AM)     Jose Plummer 19 hours ago (6:41 PM)           May I get a refill of the methylphenidate to [facility] on [address]?               Responsible Party    Deisi Villalta MD

## 2022-09-21 ENCOUNTER — PATIENT MESSAGE (OUTPATIENT)
Dept: SLEEP MEDICINE | Facility: CLINIC | Age: 32
End: 2022-09-21

## 2022-09-22 DIAGNOSIS — G47.419 NARCOLEPSY WITHOUT CATAPLEXY: ICD-10-CM

## 2022-09-22 RX ORDER — METHYLPHENIDATE HYDROCHLORIDE 10 MG/1
TABLET ORAL
Qty: 150 TABLET | Refills: 0 | Status: SHIPPED | OUTPATIENT
Start: 2022-09-22 | End: 2022-11-04 | Stop reason: SDUPTHER

## 2022-10-13 ENCOUNTER — OFFICE VISIT (OUTPATIENT)
Dept: SLEEP MEDICINE | Facility: CLINIC | Age: 32
End: 2022-10-13
Payer: COMMERCIAL

## 2022-10-13 DIAGNOSIS — G47.33 OSA (OBSTRUCTIVE SLEEP APNEA): Primary | ICD-10-CM

## 2022-10-13 PROCEDURE — 1160F RVW MEDS BY RX/DR IN RCRD: CPT | Mod: CPTII,95,, | Performed by: PSYCHIATRY & NEUROLOGY

## 2022-10-13 PROCEDURE — 1159F PR MEDICATION LIST DOCUMENTED IN MEDICAL RECORD: ICD-10-PCS | Mod: CPTII,95,, | Performed by: PSYCHIATRY & NEUROLOGY

## 2022-10-13 PROCEDURE — 1159F MED LIST DOCD IN RCRD: CPT | Mod: CPTII,95,, | Performed by: PSYCHIATRY & NEUROLOGY

## 2022-10-13 PROCEDURE — 1160F PR REVIEW ALL MEDS BY PRESCRIBER/CLIN PHARMACIST DOCUMENTED: ICD-10-PCS | Mod: CPTII,95,, | Performed by: PSYCHIATRY & NEUROLOGY

## 2022-10-13 PROCEDURE — 99214 OFFICE O/P EST MOD 30 MIN: CPT | Mod: 95,,, | Performed by: PSYCHIATRY & NEUROLOGY

## 2022-10-13 PROCEDURE — 99214 PR OFFICE/OUTPT VISIT, EST, LEVL IV, 30-39 MIN: ICD-10-PCS | Mod: 95,,, | Performed by: PSYCHIATRY & NEUROLOGY

## 2022-10-13 NOTE — PROGRESS NOTES
"the patient location is: home  The chief complaint leading to consultation is: 30 min  Visit type: audiovisual  Total time spent with patient: 30 min  Each patient to whom he or she provides medical services by telemedicine is:  (1) informed of the relationship between the physician and patient and the respective role of any other health care provider with respect to management of the patient; and (2) notified that he or she may decline to receive medical services by telemedicine and may withdraw from such care at any time.            EPWORTH SLEEPINESS SCALE TOTAL SCORE  10/13/2022 10/4/2021 9/24/2021 7/29/2019 5/26/2015   Total score 11 11 11 10 9       Jose Plummer is a 32 y.o. male seen today for Narcoelspy follow up. Last seen on 10/4/2021.    Reports some tooth grinding - questioning if the stimulants played a role. Reports tooth grinding.  Tried CBD to see if it can help him decrease the Ritalin dose - reduced the dose from 10 mg x4 per day to 10 mg two  times a day.   He felt viraj "alert and clear headed" when taking CBD (took cbd drops under his tongue).   Cleaning pools now    Moved to new home; thinking of having another child (so would not consider Xywavnow)    Currently taking 4-5 of 10 mg Methylphenidate IR now, when not taking CBD. States that excessive daytime sleepiness is well controlled.   No cataplexy episodes lately, no sleep paralysis or hallucinations    Bedtime: 9:30-10 PM  Sleep latency: fast  Nocturnal awakenings:sveral times due to new child at home tp zero  Wake up time: 6-6:30 Am    Medications pertinent to sleep disorders: Ritaling 10 mg QID.   Previously tried medications: Concerta - but not affordable now    PREVIOUSLY TRIED MEDICATION FOR PRESENTING CONDITION:    Concerta - was effective at 36 mg together with Ritalin IR, but denied by insurance. Provigil and Nuvigil were tried initially, but Jose Plummer  Has developed tolerance.   Possibility of using Xyrem was " "discussed at length, but the patient deferred due to the implications of taking this medications, highly controlled quality, needs to take twice a night and potential side effects.       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    Updated sleep schedule  10/13/2022    Bed partner: His wife Mylene  Time to bed: 10-11 PM  Sleep onset latency: 5-10 min  Disruptions or awakenings: 0  Time to fall back into sleep: not long  Wakeup time: 5 AM for morning shift; or till 10:30-11:11 AM  Sleeps well through he night.    Perceived sleep quality: 3/5  Perceived total sleep time:  7-8  hours.  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   MSLT 15 seconds. REM sleep on 3/4 naps.                        REVIEW OF SYSTEMS:   Sleep related symptoms as per HPI, lack of appetite after 2pm (not eating dinners with family anymore), denies depression but not sure or not, aggravated by feeling sleepy, Otherwise a balance review of 10-systems is negative.       PHYSICAL EXAM:  There were no vitals taken for this visit. /82 was reported.  GENERAL: Normal development, well groomed.      ASSESSMENT:    1.Narcolepsy with mild cataplexy recently (slurred speech). PSG was negative for  sleep disordered breathing. He still reports significant sleepiness affecting his job and daily life. he denies sleep paralysis, hallucinations. He appears to sleep sufficient hours. Ritalin 10 mg 4-5 times a day seems to be effective, however he finds CBD oil to also be quite effective in controlling excessive daytime sleepiness and lethargy (aka "brain fog").      PLAN:      Continue Ritalin 10 mg - 4-5  times a day    OK to continue 0.5/ml CBD PRN    Possibility of  adding Xywav or Wakix was again discussed with the patient.       Precautions: The patient " was advised to abstain from driving should he feel sleepy or drowsy.    RTC 12-wks. Case discussed with Dr. Villalta

## 2022-11-07 DIAGNOSIS — G47.419 NARCOLEPSY WITHOUT CATAPLEXY: ICD-10-CM

## 2022-11-07 RX ORDER — METHYLPHENIDATE HYDROCHLORIDE 10 MG/1
TABLET ORAL
Qty: 150 TABLET | Refills: 0 | OUTPATIENT
Start: 2022-11-07

## 2023-04-25 ENCOUNTER — HOSPITAL ENCOUNTER (EMERGENCY)
Facility: HOSPITAL | Age: 33
Discharge: HOME | End: 2023-04-25
Attending: EMERGENCY MEDICINE

## 2023-04-25 VITALS
TEMPERATURE: 98 F | BODY MASS INDEX: 31.39 KG/M2 | SYSTOLIC BLOOD PRESSURE: 135 MMHG | DIASTOLIC BLOOD PRESSURE: 88 MMHG | RESPIRATION RATE: 18 BRPM | HEIGHT: 67 IN | OXYGEN SATURATION: 97 % | WEIGHT: 200 LBS | HEART RATE: 89 BPM

## 2023-04-25 DIAGNOSIS — G43.809 OTHER MIGRAINE WITHOUT STATUS MIGRAINOSUS, NOT INTRACTABLE: Primary | ICD-10-CM

## 2023-04-25 LAB
CREAT SERPL-MCNC: 0.8 MG/DL (ref 0.5–1.4)
SAMPLE: NORMAL

## 2023-04-25 PROCEDURE — 96374 THER/PROPH/DIAG INJ IV PUSH: CPT

## 2023-04-25 PROCEDURE — 99284 EMERGENCY DEPT VISIT MOD MDM: CPT

## 2023-04-25 PROCEDURE — 63600175 PHARM REV CODE 636 W HCPCS: Performed by: NURSE PRACTITIONER

## 2023-04-25 PROCEDURE — 96375 TX/PRO/DX INJ NEW DRUG ADDON: CPT

## 2023-04-25 RX ORDER — DIPHENHYDRAMINE HYDROCHLORIDE 50 MG/ML
12.5 INJECTION INTRAMUSCULAR; INTRAVENOUS
Status: COMPLETED | OUTPATIENT
Start: 2023-04-25 | End: 2023-04-25

## 2023-04-25 RX ORDER — KETOROLAC TROMETHAMINE 30 MG/ML
15 INJECTION, SOLUTION INTRAMUSCULAR; INTRAVENOUS
Status: COMPLETED | OUTPATIENT
Start: 2023-04-25 | End: 2023-04-25

## 2023-04-25 RX ORDER — BUTALBITAL, ACETAMINOPHEN AND CAFFEINE 50; 325; 40 MG/1; MG/1; MG/1
1 TABLET ORAL EVERY 4 HOURS PRN
Qty: 30 TABLET | Refills: 0 | Status: SHIPPED | OUTPATIENT
Start: 2023-04-25 | End: 2023-05-25

## 2023-04-25 RX ORDER — PROCHLORPERAZINE EDISYLATE 5 MG/ML
10 INJECTION INTRAMUSCULAR; INTRAVENOUS
Status: COMPLETED | OUTPATIENT
Start: 2023-04-25 | End: 2023-04-25

## 2023-04-25 RX ADMIN — PROCHLORPERAZINE EDISYLATE 10 MG: 5 INJECTION INTRAMUSCULAR; INTRAVENOUS at 08:04

## 2023-04-25 RX ADMIN — DIPHENHYDRAMINE HYDROCHLORIDE 12.5 MG: 50 INJECTION INTRAMUSCULAR; INTRAVENOUS at 08:04

## 2023-04-25 RX ADMIN — KETOROLAC TROMETHAMINE 15 MG: 30 INJECTION, SOLUTION INTRAMUSCULAR; INTRAVENOUS at 08:04

## 2023-04-25 NOTE — DISCHARGE INSTRUCTIONS
Make a follow-up appoint with [facility] in about 3 days.  Return to the ED for any worsening of symptoms or any other concerns.  Take medication I have prescribed as directed.

## 2023-04-25 NOTE — ED PROVIDER NOTES
Encounter Date: 4/25/2023       History     Chief Complaint   Patient presents with    Headache     Headache since yesterday, pt reports frontal headache and pain behind eyes, pain better when sitting upright     Presents with headache.  Onset Sunday.  He reports his headache as a pressure.  Denies nausea vomiting.  He is photophobic.  He said movement makes his headache worse.  He says Tylenol does help it.  He is taken his last Tylenol at 3:00 a.m. this morning.  He denies dizziness.    Review of patient's allergies indicates:  No Known Allergies  Past Medical History:   Diagnosis Date    History of migraine     Narcolepsy      History reviewed. No pertinent surgical history.  Family History   Problem Relation Age of Onset    Diabetes Mother     Migraines Father     Migraines Sister     ADD / ADHD Brother      Social History     Tobacco Use    Smoking status: Former     Types: Cigarettes     Start date: 5/10/2014    Smokeless tobacco: Never    Tobacco comments:     smoked while driving   Substance Use Topics    Alcohol use: Yes     Comment: once a month    Drug use: No     Review of Systems   Constitutional:  Negative for fever.   Respiratory:  Negative for cough, shortness of breath and wheezing.    Cardiovascular:  Negative for chest pain, palpitations and leg swelling.   Gastrointestinal:  Negative for abdominal pain, diarrhea, nausea and vomiting.   Musculoskeletal:  Negative for back pain.   Skin:  Negative for rash.   Neurological:  Positive for headaches. Negative for dizziness, weakness and light-headedness.     Physical Exam     Initial Vitals [04/25/23 0742]   BP Pulse Resp Temp SpO2   (!) 123/98 88 16 98.3 °F (36.8 °C) 96 %      MAP       --         Physical Exam    Constitutional: He appears well-developed and well-nourished.   HENT:   Head: Normocephalic.   Mouth/Throat: Oropharynx is clear and moist.   Eyes: Conjunctivae are normal.   Neck: Neck supple.   Normal range of motion.  Cardiovascular:   Normal rate, regular rhythm and normal heart sounds.           Pulmonary/Chest: Breath sounds normal. No respiratory distress.   Musculoskeletal:         General: Normal range of motion.      Cervical back: Normal range of motion and neck supple.     Neurological: He is alert and oriented to person, place, and time. No sensory deficit. GCS score is 15. GCS eye subscore is 4. GCS verbal subscore is 5. GCS motor subscore is 6.   Skin: Skin is warm. Capillary refill takes less than 2 seconds.   Psychiatric: He has a normal mood and affect. Thought content normal.       ED Course   Procedures  Labs Reviewed   ISTAT CREATININE   POCT CREATININE          Imaging Results    None          Medications   ketorolac injection 15 mg (15 mg Intravenous Given 4/25/23 0851)   prochlorperazine injection Soln 10 mg (10 mg Intravenous Given 4/25/23 0852)   diphenhydrAMINE injection 12.5 mg (12.5 mg Intravenous Given 4/25/23 0851)     Medical Decision Making:   Initial Assessment:   Presents with headache.  He reports headache as a pressure.  He is photophobic.  Denies nausea vomiting.  Patient reports moving his head makes it worse.  Does report taking Tylenol at 3:00 a.m. today which helped his headache.   ED Management:  Patient was given a L of normal saline with Toradol Compazine and Benadryl.  Patient reports his headache is totally resolved.  He has been instructed to follow up with his primary care doctor.  Have given him a prescription for as trich for home use.  Patient has been given return precautions.  At no time while in the ED did he appear to be in any acute distress.   I have discussed this patient with                         Clinical Impression:   Final diagnoses:  [G43.809] Other migraine without status migrainosus, not intractable (Primary)        ED Disposition Condition    Discharge Stable          ED Prescriptions       Medication Sig Dispense Start Date End Date Auth. Provider     butalbital-acetaminophen-caffeine -40 mg (FIORICET, ESGIC) -40 mg per tablet Take 1 tablet by mouth every 4 (four) hours as needed for Pain. 30 tablet 4/25/2023 5/25/2023 Dawn Graham NP          Follow-up Information       Follow up With Specialties Details Why Contact Info    [facility]  In 3 days  [address] [place] [address]  [phone]               Dawn Graham NP  04/25/23 0968

## 2023-04-25 NOTE — Clinical Note
"Jose Arce (Michael)quez was seen and treated in our emergency department on 4/25/2023.  He may return to work on 04/26/2023.       If you have any questions or concerns, please don't hesitate to call.      Dawn Graham NP"

## 2023-05-10 DIAGNOSIS — G47.419 NARCOLEPSY WITHOUT CATAPLEXY: ICD-10-CM

## 2023-05-10 RX ORDER — METHYLPHENIDATE HYDROCHLORIDE 10 MG/1
TABLET ORAL
Qty: 150 TABLET | Refills: 0 | Status: SHIPPED | OUTPATIENT
Start: 2023-05-10 | End: 2023-07-20 | Stop reason: SDUPTHER

## 2023-07-20 DIAGNOSIS — G47.419 NARCOLEPSY WITHOUT CATAPLEXY: ICD-10-CM

## 2023-07-20 RX ORDER — METHYLPHENIDATE HYDROCHLORIDE 10 MG/1
TABLET ORAL
Qty: 150 TABLET | Refills: 0 | Status: SHIPPED | OUTPATIENT
Start: 2023-07-20 | End: 2023-09-19 | Stop reason: SDUPTHER

## 2023-09-19 ENCOUNTER — PATIENT MESSAGE (OUTPATIENT)
Dept: SLEEP MEDICINE | Facility: CLINIC | Age: 33
End: 2023-09-19

## 2023-09-19 DIAGNOSIS — G47.419 NARCOLEPSY WITHOUT CATAPLEXY: ICD-10-CM

## 2023-09-20 RX ORDER — METHYLPHENIDATE HYDROCHLORIDE 10 MG/1
TABLET ORAL
Qty: 150 TABLET | Refills: 0 | Status: SHIPPED | OUTPATIENT
Start: 2023-09-20 | End: 2023-11-30 | Stop reason: SDUPTHER

## 2023-09-20 NOTE — TELEPHONE ENCOUNTER
Requested Prescriptions     Pending Prescriptions Disp Refills    methylphenidate HCl (RITALIN) 10 MG tablet 150 tablet 0     Si pills PO in the morning, 2 pills at noon  and 1 pill in the early afternoon PO as needed for excessive daytime sleepiness .     LOV 10/13/2022

## 2023-11-30 ENCOUNTER — OFFICE VISIT (OUTPATIENT)
Dept: SLEEP MEDICINE | Facility: CLINIC | Age: 33
End: 2023-11-30

## 2023-11-30 DIAGNOSIS — G47.419 NARCOLEPSY WITHOUT CATAPLEXY: ICD-10-CM

## 2023-11-30 PROCEDURE — 99214 OFFICE O/P EST MOD 30 MIN: CPT | Mod: 95,,, | Performed by: PSYCHIATRY & NEUROLOGY

## 2023-11-30 PROCEDURE — 99214 PR OFFICE/OUTPT VISIT, EST, LEVL IV, 30-39 MIN: ICD-10-PCS | Mod: 95,,, | Performed by: PSYCHIATRY & NEUROLOGY

## 2023-11-30 RX ORDER — METHYLPHENIDATE HYDROCHLORIDE 10 MG/1
TABLET ORAL
Qty: 150 TABLET | Refills: 0 | Status: SHIPPED | OUTPATIENT
Start: 2023-11-30 | End: 2024-02-07 | Stop reason: SDUPTHER

## 2023-11-30 NOTE — PROGRESS NOTES
the patient location is: home  The chief complaint leading to consultation is: 30 min  Visit type: audiovisual  Total time spent with patient: 30 min  Each patient to whom he or she provides medical services by telemedicine is:  (1) informed of the relationship between the physician and patient and the respective role of any other health care provider with respect to management of the patient; and (2) notified that he or she may decline to receive medical services by telemedicine and may withdraw from such care at any time.              11/29/2023     9:47 PM 10/13/2022     9:02 AM 10/4/2021     9:50 AM 9/24/2021    11:53 AM 7/29/2019     2:04 PM 5/26/2015     8:00 AM   EPWORTH SLEEPINESS SCALE TOTAL SCORE    Total score 9 11 11 11 10 9       Jose Plummer is a 33 y.o. male seen today for NArcoepsu follow up. 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    Still finds his excessive daytime sleepiness well controlled on Ritalin 10 mg 4-5 pills per day - last one no later than 3:30 Pm    Denied difficulty falling and staying asleep.  No recent episodes of cataplexy, sleep paralysis or sleep related hallucinations.    Reports some tooth grinding when taking 5 pills of Ritalin- questioning if the stimulants played a role. Reports tooth grinding.  Tried CBD - on the day that he takes it, he needs much less Ritalin, hpwever CBD is expensive and he does not take it often.  BP is still stable as per the EMR.     Not considering trying newest medication for narxolepy management as he is just getting the insurance from his new employer.     Bedtime: 9:30-10 PM  Sleep latency: fast  Nocturnal awakenings:sveral times due to new child at home tp zero  Wake up time: 6-6:30 Am    Medications pertinent to sleep disorders: Ritaling 10 mg QID.   Previously tried medications: Concerta - but not affordable now    PREVIOUSLY TRIED MEDICATION FOR  "PRESENTING CONDITION:    Concerta - was effective at 36 mg together with Ritalin IR, but denied by insurance. Provigil and Nuvigil were tried initially, but Jose Inman Elian  Has developed tolerance.        PREVIOUS SLEEP STUDIES:   PSG 8/14: AHI 0.1; RDI 2.5 and SaO2 of 92% (weight  163 lbs).   MSLT 15 seconds. REM sleep on 3/4 naps.                        REVIEW OF SYSTEMS:   Sleep related symptoms as per HPI, lack of appetite after 2pm (not eating dinners with family anymore), denies depression but not sure or not, aggravated by feeling sleepy, Otherwise a balance review of 10-systems is negative.       PHYSICAL EXAM:  There were no vitals taken for this visit. /82 was reported.  GENERAL: Normal development, well groomed.      ASSESSMENT:    1.Narcolepsy with mild cataplexy recently (slurred speech). PSG was negative for  sleep disordered breathing. He still reports significant sleepiness affecting his job and daily life. he denies sleep paralysis, hallucinations. He appears to sleep sufficient hours. Ritalin 10 mg 4-5 times a day seems to be effective, however he finds CBD oil to also be quite effective in controlling excessive daytime sleepiness and lethargy (aka "brain fog"), hower unable to afford CBD regularly.       PLAN:      Continue Ritalin 10 mg - 4-5  times a day  Will reorder.     OK to continue 0.5/ml CBD PRN    Possibility of  adding Xywav or Wakix was again discussed with the patient - not a good time - just in the process of getting a new insurance at his new job.       Precautions: The patient was advised to abstain from driving should he feel sleepy or drowsy.    RTC 12-wks. Case discussed with Dr. Villalta            "

## 2023-12-01 ENCOUNTER — TELEPHONE (OUTPATIENT)
Dept: SLEEP MEDICINE | Facility: CLINIC | Age: 33
End: 2023-12-01

## 2023-12-01 NOTE — TELEPHONE ENCOUNTER
----- Message from Deisi Villalta MD sent at 11/30/2023  1:48 PM CST -----  Hello,    Can you please put Jose Plummer on 1 year recall?    Thank you!

## 2024-02-07 DIAGNOSIS — G47.419 NARCOLEPSY WITHOUT CATAPLEXY: ICD-10-CM

## 2024-02-07 RX ORDER — METHYLPHENIDATE HYDROCHLORIDE 10 MG/1
TABLET ORAL
Qty: 150 TABLET | Refills: 0 | Status: SHIPPED | OUTPATIENT
Start: 2024-02-07 | End: 2024-02-14 | Stop reason: SDUPTHER

## 2024-02-14 ENCOUNTER — PATIENT MESSAGE (OUTPATIENT)
Dept: SLEEP MEDICINE | Facility: CLINIC | Age: 34
End: 2024-02-14

## 2024-02-14 DIAGNOSIS — G47.419 NARCOLEPSY WITHOUT CATAPLEXY: ICD-10-CM

## 2024-02-14 RX ORDER — METHYLPHENIDATE HYDROCHLORIDE 10 MG/1
TABLET ORAL
Qty: 150 TABLET | Refills: 0 | Status: SHIPPED | OUTPATIENT
Start: 2024-02-14 | End: 2024-03-12 | Stop reason: SDUPTHER

## 2024-02-15 ENCOUNTER — PATIENT MESSAGE (OUTPATIENT)
Dept: SLEEP MEDICINE | Facility: CLINIC | Age: 34
End: 2024-02-15

## 2024-02-15 ENCOUNTER — TELEPHONE (OUTPATIENT)
Dept: SLEEP MEDICINE | Facility: CLINIC | Age: 34
End: 2024-02-15

## 2024-02-15 RX ORDER — METHYLPHENIDATE HYDROCHLORIDE 20 MG/1
20 CAPSULE, EXTENDED RELEASE ORAL EVERY MORNING
Qty: 30 CAPSULE | Refills: 0 | Status: SHIPPED | OUTPATIENT
Start: 2024-02-15 | End: 2024-03-13 | Stop reason: SDUPTHER

## 2024-02-16 NOTE — TELEPHONE ENCOUNTER
Received the note from the pharmacy:      Sorin huang afternoon. We received RX for ritalin 10 for Mr Garcia. Unfortunately this is on a national backorder at this time and we do not have in stock. It looks like ritalin 20mg is available. If RX is approved to be changed, can we have new RX sent in for patient at the [facility] in [place]? Thank you     Original prescription:        methylphenidate HCl 10 MG Take 2 tablets by mouth in the morning, 2 tablets at noon  and 1 tablet in the early afternoon as needed for excessive daytime sleepiness   _____________________________________________________      Will order 20 mg Ritalin LA BID ti [facility]

## 2024-03-12 ENCOUNTER — PATIENT MESSAGE (OUTPATIENT)
Dept: SLEEP MEDICINE | Facility: CLINIC | Age: 34
End: 2024-03-12

## 2024-03-12 DIAGNOSIS — G47.419 NARCOLEPSY WITHOUT CATAPLEXY: ICD-10-CM

## 2024-03-12 RX ORDER — METHYLPHENIDATE HYDROCHLORIDE 10 MG/1
TABLET ORAL
Qty: 150 TABLET | Refills: 0 | Status: SHIPPED | OUTPATIENT
Start: 2024-03-12 | End: 2024-05-22 | Stop reason: SDUPTHER

## 2024-03-13 DIAGNOSIS — Z13.39 ADHD (ATTENTION DEFICIT HYPERACTIVITY DISORDER) EVALUATION: ICD-10-CM

## 2024-03-13 DIAGNOSIS — G47.419 NARCOLEPSY WITHOUT CATAPLEXY: Primary | ICD-10-CM

## 2024-03-13 RX ORDER — METHYLPHENIDATE HYDROCHLORIDE 20 MG/1
20 CAPSULE, EXTENDED RELEASE ORAL EVERY MORNING
Qty: 30 CAPSULE | Refills: 0 | Status: SHIPPED | OUTPATIENT
Start: 2024-03-13 | End: 2024-04-15 | Stop reason: SDUPTHER

## 2024-04-15 DIAGNOSIS — G47.419 NARCOLEPSY WITHOUT CATAPLEXY: ICD-10-CM

## 2024-04-15 DIAGNOSIS — Z13.39 ADHD (ATTENTION DEFICIT HYPERACTIVITY DISORDER) EVALUATION: ICD-10-CM

## 2024-04-16 RX ORDER — METHYLPHENIDATE HYDROCHLORIDE 20 MG/1
20 CAPSULE, EXTENDED RELEASE ORAL EVERY MORNING
Qty: 30 CAPSULE | Refills: 0 | Status: SHIPPED | OUTPATIENT
Start: 2024-04-16 | End: 2024-05-22 | Stop reason: SDUPTHER

## 2024-05-22 DIAGNOSIS — G47.419 NARCOLEPSY WITHOUT CATAPLEXY: ICD-10-CM

## 2024-05-22 DIAGNOSIS — Z13.39 ADHD (ATTENTION DEFICIT HYPERACTIVITY DISORDER) EVALUATION: ICD-10-CM

## 2024-05-23 RX ORDER — METHYLPHENIDATE HYDROCHLORIDE 10 MG/1
TABLET ORAL
Qty: 150 TABLET | Refills: 0 | Status: SHIPPED | OUTPATIENT
Start: 2024-05-23

## 2024-05-23 RX ORDER — METHYLPHENIDATE HYDROCHLORIDE 20 MG/1
20 CAPSULE, EXTENDED RELEASE ORAL EVERY MORNING
Qty: 30 CAPSULE | Refills: 0 | Status: SHIPPED | OUTPATIENT
Start: 2024-05-23

## 2024-07-06 DIAGNOSIS — G47.419 NARCOLEPSY WITHOUT CATAPLEXY: ICD-10-CM

## 2024-07-06 DIAGNOSIS — Z13.39 ADHD (ATTENTION DEFICIT HYPERACTIVITY DISORDER) EVALUATION: ICD-10-CM

## 2024-07-09 RX ORDER — METHYLPHENIDATE HYDROCHLORIDE 20 MG/1
20 CAPSULE, EXTENDED RELEASE ORAL EVERY MORNING
Qty: 30 CAPSULE | Refills: 0 | Status: SHIPPED | OUTPATIENT
Start: 2024-07-09

## 2024-08-09 DIAGNOSIS — Z13.39 ADHD (ATTENTION DEFICIT HYPERACTIVITY DISORDER) EVALUATION: ICD-10-CM

## 2024-08-09 DIAGNOSIS — G47.419 NARCOLEPSY WITHOUT CATAPLEXY: ICD-10-CM

## 2024-08-12 RX ORDER — METHYLPHENIDATE HYDROCHLORIDE 20 MG/1
20 CAPSULE, EXTENDED RELEASE ORAL EVERY MORNING
Qty: 30 CAPSULE | Refills: 0 | Status: SHIPPED | OUTPATIENT
Start: 2024-08-12

## 2024-08-12 RX ORDER — METHYLPHENIDATE HYDROCHLORIDE 10 MG/1
TABLET ORAL
Qty: 150 TABLET | Refills: 0 | Status: SHIPPED | OUTPATIENT
Start: 2024-08-12

## 2024-09-30 ENCOUNTER — PATIENT MESSAGE (OUTPATIENT)
Dept: SLEEP MEDICINE | Facility: CLINIC | Age: 34
End: 2024-09-30

## 2024-10-23 DIAGNOSIS — Z13.39 ADHD (ATTENTION DEFICIT HYPERACTIVITY DISORDER) EVALUATION: ICD-10-CM

## 2024-10-23 DIAGNOSIS — G47.419 NARCOLEPSY WITHOUT CATAPLEXY: ICD-10-CM

## 2024-10-23 RX ORDER — METHYLPHENIDATE HYDROCHLORIDE 10 MG/1
TABLET ORAL
Qty: 150 TABLET | Refills: 0 | Status: SHIPPED | OUTPATIENT
Start: 2024-10-23

## 2024-10-23 RX ORDER — METHYLPHENIDATE HYDROCHLORIDE 20 MG/1
20 CAPSULE, EXTENDED RELEASE ORAL EVERY MORNING
Qty: 30 CAPSULE | Refills: 0 | Status: SHIPPED | OUTPATIENT
Start: 2024-10-23

## 2024-11-09 ENCOUNTER — PATIENT MESSAGE (OUTPATIENT)
Dept: SLEEP MEDICINE | Facility: CLINIC | Age: 34
End: 2024-11-09

## 2024-11-26 DIAGNOSIS — Z13.39 ADHD (ATTENTION DEFICIT HYPERACTIVITY DISORDER) EVALUATION: ICD-10-CM

## 2024-11-26 DIAGNOSIS — G47.419 NARCOLEPSY WITHOUT CATAPLEXY: ICD-10-CM

## 2024-11-26 RX ORDER — METHYLPHENIDATE HYDROCHLORIDE 20 MG/1
20 CAPSULE, EXTENDED RELEASE ORAL EVERY MORNING
Qty: 30 CAPSULE | Refills: 0 | Status: SHIPPED | OUTPATIENT
Start: 2024-11-26

## 2024-12-27 DIAGNOSIS — G47.419 NARCOLEPSY WITHOUT CATAPLEXY: ICD-10-CM

## 2024-12-27 DIAGNOSIS — Z13.39 ADHD (ATTENTION DEFICIT HYPERACTIVITY DISORDER) EVALUATION: ICD-10-CM

## 2024-12-31 RX ORDER — METHYLPHENIDATE HYDROCHLORIDE 20 MG/1
20 CAPSULE, EXTENDED RELEASE ORAL EVERY MORNING
Qty: 30 CAPSULE | Refills: 0 | Status: SHIPPED | OUTPATIENT
Start: 2024-12-31

## 2024-12-31 RX ORDER — METHYLPHENIDATE HYDROCHLORIDE 10 MG/1
TABLET ORAL
Qty: 150 TABLET | Refills: 0 | Status: SHIPPED | OUTPATIENT
Start: 2024-12-31

## 2025-02-13 DIAGNOSIS — G47.419 NARCOLEPSY WITHOUT CATAPLEXY: ICD-10-CM

## 2025-02-13 DIAGNOSIS — Z13.39 ADHD (ATTENTION DEFICIT HYPERACTIVITY DISORDER) EVALUATION: ICD-10-CM

## 2025-02-14 RX ORDER — METHYLPHENIDATE HYDROCHLORIDE 20 MG/1
20 CAPSULE, EXTENDED RELEASE ORAL EVERY MORNING
Qty: 30 CAPSULE | Refills: 0 | Status: SHIPPED | OUTPATIENT
Start: 2025-02-14

## 2025-03-08 DIAGNOSIS — G47.419 NARCOLEPSY WITHOUT CATAPLEXY: ICD-10-CM

## 2025-03-10 RX ORDER — METHYLPHENIDATE HYDROCHLORIDE 10 MG/1
TABLET ORAL
Qty: 150 TABLET | Refills: 0 | Status: SHIPPED | OUTPATIENT
Start: 2025-03-10

## 2025-05-03 DIAGNOSIS — Z13.39 ADHD (ATTENTION DEFICIT HYPERACTIVITY DISORDER) EVALUATION: ICD-10-CM

## 2025-05-03 DIAGNOSIS — G47.419 NARCOLEPSY WITHOUT CATAPLEXY: ICD-10-CM

## 2025-05-05 RX ORDER — METHYLPHENIDATE HYDROCHLORIDE 20 MG/1
20 CAPSULE, EXTENDED RELEASE ORAL EVERY MORNING
Qty: 30 CAPSULE | Refills: 0 | Status: SHIPPED | OUTPATIENT
Start: 2025-05-05

## 2025-05-05 RX ORDER — METHYLPHENIDATE HYDROCHLORIDE 10 MG/1
TABLET ORAL
Qty: 150 TABLET | Refills: 0 | Status: SHIPPED | OUTPATIENT
Start: 2025-05-05

## 2025-05-05 NOTE — TELEPHONE ENCOUNTER
Requested Prescriptions     Pending Prescriptions Disp Refills    methylphenidate HCl (RITALIN LA) 20 MG 24 hr capsule 30 capsule 0     Sig: Take 1 capsule (20 mg total) by mouth every morning.     Lov 11/30/23

## 2025-06-19 DIAGNOSIS — G47.419 NARCOLEPSY WITHOUT CATAPLEXY: ICD-10-CM

## 2025-06-19 DIAGNOSIS — Z13.39 ADHD (ATTENTION DEFICIT HYPERACTIVITY DISORDER) EVALUATION: ICD-10-CM

## 2025-06-19 RX ORDER — METHYLPHENIDATE HYDROCHLORIDE 20 MG/1
20 CAPSULE, EXTENDED RELEASE ORAL EVERY MORNING
Qty: 30 CAPSULE | Refills: 0 | Status: SHIPPED | OUTPATIENT
Start: 2025-06-19

## 2025-06-23 ENCOUNTER — OFFICE VISIT (OUTPATIENT)
Dept: SLEEP MEDICINE | Facility: CLINIC | Age: 35
End: 2025-06-23
Attending: PSYCHIATRY & NEUROLOGY
Payer: COMMERCIAL

## 2025-06-23 VITALS
SYSTOLIC BLOOD PRESSURE: 140 MMHG | HEIGHT: 67 IN | DIASTOLIC BLOOD PRESSURE: 91 MMHG | HEART RATE: 80 BPM | BODY MASS INDEX: 28.49 KG/M2 | WEIGHT: 181.5 LBS

## 2025-06-23 DIAGNOSIS — G47.419 NARCOLEPSY WITHOUT CATAPLEXY: Primary | ICD-10-CM

## 2025-06-23 PROCEDURE — 3077F SYST BP >= 140 MM HG: CPT | Mod: CPTII,S$GLB,, | Performed by: PSYCHIATRY & NEUROLOGY

## 2025-06-23 PROCEDURE — 3080F DIAST BP >= 90 MM HG: CPT | Mod: CPTII,S$GLB,, | Performed by: PSYCHIATRY & NEUROLOGY

## 2025-06-23 PROCEDURE — 99999 PR PBB SHADOW E&M-EST. PATIENT-LVL III: CPT | Mod: PBBFAC,,, | Performed by: PSYCHIATRY & NEUROLOGY

## 2025-06-23 PROCEDURE — 99214 OFFICE O/P EST MOD 30 MIN: CPT | Mod: S$GLB,,, | Performed by: PSYCHIATRY & NEUROLOGY

## 2025-06-23 PROCEDURE — 3008F BODY MASS INDEX DOCD: CPT | Mod: CPTII,S$GLB,, | Performed by: PSYCHIATRY & NEUROLOGY

## 2025-06-23 NOTE — PROGRESS NOTES
"Taking 20 mg mg  Methylphenidate XR and 3  tablets 10 mg Ritalin IR    Taking Ritalin IR 10 mg  boosters- 7 AM, then 10 AM, 3:30-4 PM  No recent cataplexy events. No recent sleep paralysis or sleep related hallucinations.    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    Work days only now since pandemics.  Residual "brain fog".    Not drinking alcohol  other than on rare occasions.    Reasy to try Wakix.    Concerned about Xywav as a controlled substance (has 2 young children at home)                6/16/2025     6:06 PM 11/29/2023     9:47 PM 10/13/2022     9:02 AM 10/4/2021     9:50 AM 9/24/2021    11:53 AM 7/29/2019     2:04 PM 5/26/2015     8:00 AM   EPWORTH SLEEPINESS SCALE TOTAL SCORE    Total score 9  9 11 11 11 10 9       Patient-reported       Jose Plummer is a 35 y.o. male seen today for the  follow up. Last seen on 11/30/2023    TELEMEDICINE VISIT    Jose Plummer is a 35 y.o. male seen today for NArcoepsu follow up. 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    Still finds his excessive daytime sleepiness well controlled on Ritalin 10 mg 4-5 pills per day - last one no later than 3:30 Pm    Denied difficulty falling and staying asleep.  No recent episodes of cataplexy, sleep paralysis or sleep related hallucinations.    Reports some tooth grinding when taking 5 pills of Ritalin- questioning if the stimulants played a role. Reports tooth grinding.  Tried CBD - on the day that he takes it, he needs much less Ritalin, hpwever CBD is expensive and he does not take it often.  BP is still stable as per the EMR.     Not considering trying newest medication for narxolepy management as he is just getting the insurance from his new employer.     Bedtime: 9:30-10 PM  Sleep latency: fast  Nocturnal awakenings:sveral times due to new child at home tp " "zero  Wake up time: 6-6:30 Am    Medications pertinent to sleep disorders: Ritaling 10 mg QID.   Previously tried medications: Concerta - but not affordable now    PREVIOUSLY TRIED MEDICATION FOR PRESENTING CONDITION:    Concerta - was effective at 36 mg together with Ritalin IR, but denied by insurance. Provigil and Nuvigil were tried initially, but Jose Inman Elian  Has developed tolerance.        PREVIOUS SLEEP STUDIES:   PSG 8/14: AHI 0.1; RDI 2.5 and SaO2 of 92% (weight  163 lbs).   MSLT 15 seconds. REM sleep on 3/4 naps.                        REVIEW OF SYSTEMS:   Sleep related symptoms as per HPI, lack of appetite after 2pm (not eating dinners with family anymore), denies depression but not sure or not, aggravated by feeling sleepy, Otherwise a balance review of 10-systems is negative.       PHYSICAL EXAM:  BP (!) 140/91 (BP Location: Left arm, Patient Position: Sitting)   Pulse 80   Ht 5' 7" (1.702 m)   Wt 82.3 kg (181 lb 8 oz)   BMI 28.43 kg/m²  /82 was reported.  GENERAL: Normal development, well groomed.      ASSESSMENT:    1.Narcolepsy with mild cataplexy recently (slurred speech). PSG was negative for  sleep disordered breathing. He still reports significant sleepiness affecting his job and daily life. he denies sleep paralysis, hallucinations. He appears to sleep sufficient hours. Ritalin 10 mg 4-5 times a day seems to be effective, however he finds CBD oil to also be quite effective in controlling excessive daytime sleepiness and lethargy (aka "brain fog"), hower unable to afford CBD regularly.       PLAN:    Will change Ritalin 20 mg XR from 1 to 2 pills a day (will send to [facility] for authorization) - I have not sent the order for Ritalin  XR 20 increase from 30 to 60 pills - he will try this regimen at home wih his existing prescription first, and will let me know later, if he wants to proceed.   OK to Add Ritalin 10 mg as needed (not to exceed 60 mg per day)    + ADHD " diagnosis    Will order Wakix       Precautions: The patient was advised to abstain from driving should he feel sleepy or drowsy.    RTC 12-wks. Case discussed with Dr. Villalta

## 2025-06-23 NOTE — PATIENT INSTRUCTIONS
Wakix - is the medication we are planning to try  Ritalin XR 20 mg - try 2 pills per day  Let me know how it works.

## 2025-07-09 ENCOUNTER — PATIENT MESSAGE (OUTPATIENT)
Dept: SLEEP MEDICINE | Facility: CLINIC | Age: 35
End: 2025-07-09
Payer: COMMERCIAL

## 2025-07-10 DIAGNOSIS — G47.419 NARCOLEPSY WITHOUT CATAPLEXY: ICD-10-CM

## 2025-07-10 DIAGNOSIS — Z13.39 ADHD (ATTENTION DEFICIT HYPERACTIVITY DISORDER) EVALUATION: ICD-10-CM

## 2025-07-10 RX ORDER — METHYLPHENIDATE HYDROCHLORIDE 20 MG/1
CAPSULE, EXTENDED RELEASE ORAL
Qty: 60 CAPSULE | Refills: 0 | Status: SHIPPED | OUTPATIENT
Start: 2025-07-10

## 2025-07-10 NOTE — PROGRESS NOTES
Ritalin XR 20 increase from 30 to 60 pills     There is one in [place] off of [address]. It's [facility]. I can go to that one  Me to Jose Plummer

## 2025-07-15 DIAGNOSIS — G47.419 NARCOLEPSY WITHOUT CATAPLEXY: ICD-10-CM

## 2025-07-15 RX ORDER — METHYLPHENIDATE HYDROCHLORIDE 10 MG/1
TABLET ORAL
Qty: 150 TABLET | Refills: 0 | Status: SHIPPED | OUTPATIENT
Start: 2025-07-15

## 2025-07-30 ENCOUNTER — TELEPHONE (OUTPATIENT)
Facility: CLINIC | Age: 35
End: 2025-07-30
Payer: COMMERCIAL

## 2025-08-06 ENCOUNTER — TELEPHONE (OUTPATIENT)
Facility: CLINIC | Age: 35
End: 2025-08-06
Payer: COMMERCIAL

## 2025-08-16 DIAGNOSIS — Z13.39 ADHD (ATTENTION DEFICIT HYPERACTIVITY DISORDER) EVALUATION: ICD-10-CM

## 2025-08-16 DIAGNOSIS — G47.419 NARCOLEPSY WITHOUT CATAPLEXY: ICD-10-CM

## 2025-08-18 RX ORDER — METHYLPHENIDATE HYDROCHLORIDE 20 MG/1
CAPSULE, EXTENDED RELEASE ORAL
Qty: 60 CAPSULE | Refills: 0 | Status: SHIPPED | OUTPATIENT
Start: 2025-08-18